# Patient Record
Sex: MALE | Race: WHITE | Employment: OTHER | URBAN - METROPOLITAN AREA
[De-identification: names, ages, dates, MRNs, and addresses within clinical notes are randomized per-mention and may not be internally consistent; named-entity substitution may affect disease eponyms.]

---

## 2017-01-17 PROBLEM — N18.30 CHRONIC KIDNEY DISEASE, STAGE III (MODERATE) (HCC): Status: ACTIVE | Noted: 2017-01-17

## 2017-02-01 ENCOUNTER — HOSPITAL ENCOUNTER (OUTPATIENT)
Dept: OTHER | Age: 82
Discharge: HOME | End: 2017-02-01
Attending: INTERNAL MEDICINE | Admitting: INTERNAL MEDICINE

## 2017-02-14 ENCOUNTER — HOSPITAL ENCOUNTER (OUTPATIENT)
Dept: OTHER | Age: 82
Discharge: HOME | End: 2017-02-14
Attending: ORTHOPAEDIC SURGERY | Admitting: ORTHOPAEDIC SURGERY

## 2017-02-24 LAB
ANION GAP SERPL CALCULATED.3IONS-SCNC: 11 MMOL/L (ref 4–16)
BUN BLDV-MCNC: 25 MG/DL (ref 6–23)
CALCIUM SERPL-MCNC: 8.6 MG/DL (ref 8.3–10.6)
CHLORIDE BLD-SCNC: 104 MMOL/L (ref 99–110)
CO2: 26 MMOL/L (ref 21–32)
CREAT SERPL-MCNC: 1.8 MG/DL (ref 0.9–1.3)
GFR AFRICAN AMERICAN: 43 ML/MIN/1.73M2
GFR NON-AFRICAN AMERICAN: 36 ML/MIN/1.73M2
GLUCOSE BLD-MCNC: 93 MG/DL (ref 70–140)
HCT VFR BLD CALC: 26.3 % (ref 42–52)
HEMOGLOBIN: 8.3 GM/DL (ref 13.5–18)
INR BLD: 1.1 INDEX
MCH RBC QN AUTO: 31.4 PG (ref 27–31)
MCHC RBC AUTO-ENTMCNC: 31.6 % (ref 32–36)
MCV RBC AUTO: 99.6 FL (ref 78–100)
PDW BLD-RTO: 13.4 % (ref 11.7–14.9)
PLATELET # BLD: 238 K/CU MM (ref 140–440)
PMV BLD AUTO: 10.2 FL (ref 7.5–11.1)
POTASSIUM SERPL-SCNC: 4.4 MMOL/L (ref 3.5–5.1)
PROTHROMBIN TIME: 12.6 SECONDS (ref 9.12–12.5)
RBC # BLD: 2.64 M/CU MM (ref 4.6–6.2)
SODIUM BLD-SCNC: 141 MMOL/L (ref 135–145)
WBC # BLD: 8.2 K/CU MM (ref 4–10.5)

## 2017-02-28 LAB
INR BLD: 1.36 INDEX
PROTHROMBIN TIME: 15.7 SECONDS (ref 9.12–12.5)

## 2017-03-01 ENCOUNTER — HOSPITAL ENCOUNTER (OUTPATIENT)
Dept: OTHER | Age: 82
Discharge: HOME | End: 2017-03-01
Attending: INTERNAL MEDICINE | Admitting: INTERNAL MEDICINE

## 2017-03-03 LAB
ANION GAP SERPL CALCULATED.3IONS-SCNC: 13 MMOL/L (ref 4–16)
BUN BLDV-MCNC: 34 MG/DL (ref 6–23)
CALCIUM SERPL-MCNC: 8.7 MG/DL (ref 8.3–10.6)
CHLORIDE BLD-SCNC: 101 MMOL/L (ref 99–110)
CO2: 23 MMOL/L (ref 21–32)
CREAT SERPL-MCNC: 1.8 MG/DL (ref 0.9–1.3)
GFR AFRICAN AMERICAN: 43 ML/MIN/1.73M2
GFR NON-AFRICAN AMERICAN: 36 ML/MIN/1.73M2
GLUCOSE BLD-MCNC: 103 MG/DL (ref 70–140)
HCT VFR BLD CALC: 29.3 % (ref 42–52)
HEMOGLOBIN: 9 GM/DL (ref 13.5–18)
INR BLD: 1.67 INDEX
MCH RBC QN AUTO: 30.8 PG (ref 27–31)
MCHC RBC AUTO-ENTMCNC: 30.7 % (ref 32–36)
MCV RBC AUTO: 100.3 FL (ref 78–100)
PDW BLD-RTO: 13.3 % (ref 11.7–14.9)
PLATELET # BLD: 388 K/CU MM (ref 140–440)
PMV BLD AUTO: 10.2 FL (ref 7.5–11.1)
POTASSIUM SERPL-SCNC: 4.8 MMOL/L (ref 3.5–5.1)
PROTHROMBIN TIME: 19.3 SECONDS (ref 9.12–12.5)
RBC # BLD: 2.92 M/CU MM (ref 4.6–6.2)
SODIUM BLD-SCNC: 137 MMOL/L (ref 135–145)
WBC # BLD: 8 K/CU MM (ref 4–10.5)

## 2017-03-07 LAB
INR BLD: 1.7 INDEX
PROTHROMBIN TIME: 19.7 SECONDS (ref 9.12–12.5)

## 2017-03-10 LAB
ANION GAP SERPL CALCULATED.3IONS-SCNC: 10 MMOL/L (ref 4–16)
BUN BLDV-MCNC: 23 MG/DL (ref 6–23)
CALCIUM SERPL-MCNC: 8.4 MG/DL (ref 8.3–10.6)
CHLORIDE BLD-SCNC: 102 MMOL/L (ref 99–110)
CO2: 26 MMOL/L (ref 21–32)
CREAT SERPL-MCNC: 1.8 MG/DL (ref 0.9–1.3)
GFR AFRICAN AMERICAN: 43 ML/MIN/1.73M2
GFR NON-AFRICAN AMERICAN: 36 ML/MIN/1.73M2
GLUCOSE BLD-MCNC: 92 MG/DL (ref 70–140)
HCT VFR BLD CALC: 27.5 % (ref 42–52)
HEMOGLOBIN: 8.3 GM/DL (ref 13.5–18)
INR BLD: 1.23 INDEX
MCH RBC QN AUTO: 30.1 PG (ref 27–31)
MCHC RBC AUTO-ENTMCNC: 30.2 % (ref 32–36)
MCV RBC AUTO: 99.6 FL (ref 78–100)
PDW BLD-RTO: 13.4 % (ref 11.7–14.9)
PLATELET # BLD: 445 K/CU MM (ref 140–440)
PMV BLD AUTO: 9.7 FL (ref 7.5–11.1)
POTASSIUM SERPL-SCNC: 4.4 MMOL/L (ref 3.5–5.1)
PROTHROMBIN TIME: 14.1 SECONDS (ref 9.12–12.5)
RBC # BLD: 2.76 M/CU MM (ref 4.6–6.2)
SODIUM BLD-SCNC: 138 MMOL/L (ref 135–145)
WBC # BLD: 7.7 K/CU MM (ref 4–10.5)

## 2017-03-14 LAB
INR BLD: 1.34 INDEX
PROTHROMBIN TIME: 15.4 SECONDS (ref 9.12–12.5)

## 2017-03-17 LAB
ANION GAP SERPL CALCULATED.3IONS-SCNC: 10 MMOL/L (ref 4–16)
BUN BLDV-MCNC: 26 MG/DL (ref 6–23)
CALCIUM SERPL-MCNC: 8.6 MG/DL (ref 8.3–10.6)
CHLORIDE BLD-SCNC: 103 MMOL/L (ref 99–110)
CO2: 26 MMOL/L (ref 21–32)
CREAT SERPL-MCNC: 1.8 MG/DL (ref 0.9–1.3)
GFR AFRICAN AMERICAN: 43 ML/MIN/1.73M2
GFR NON-AFRICAN AMERICAN: 36 ML/MIN/1.73M2
GLUCOSE BLD-MCNC: 98 MG/DL (ref 70–140)
HCT VFR BLD CALC: 28.7 % (ref 42–52)
HEMOGLOBIN: 8.7 GM/DL (ref 13.5–18)
INR BLD: 2.08 INDEX
MCH RBC QN AUTO: 29.8 PG (ref 27–31)
MCHC RBC AUTO-ENTMCNC: 30.3 % (ref 32–36)
MCV RBC AUTO: 98.3 FL (ref 78–100)
PDW BLD-RTO: 13 % (ref 11.7–14.9)
PLATELET # BLD: 352 K/CU MM (ref 140–440)
PMV BLD AUTO: 9.8 FL (ref 7.5–11.1)
POTASSIUM SERPL-SCNC: 4.2 MMOL/L (ref 3.5–5.1)
PROTHROMBIN TIME: 24.2 SECONDS (ref 9.12–12.5)
RBC # BLD: 2.92 M/CU MM (ref 4.6–6.2)
SODIUM BLD-SCNC: 139 MMOL/L (ref 135–145)
WBC # BLD: 5.8 K/CU MM (ref 4–10.5)

## 2017-04-24 ENCOUNTER — OFFICE VISIT (OUTPATIENT)
Dept: CARDIOLOGY CLINIC | Age: 82
End: 2017-04-24

## 2017-04-24 VITALS
SYSTOLIC BLOOD PRESSURE: 138 MMHG | HEIGHT: 71 IN | HEART RATE: 68 BPM | WEIGHT: 165.4 LBS | DIASTOLIC BLOOD PRESSURE: 68 MMHG | BODY MASS INDEX: 23.15 KG/M2

## 2017-04-24 DIAGNOSIS — I82.403 DEEP VEIN THROMBOSIS (DVT) OF BOTH LOWER EXTREMITIES, UNSPECIFIED CHRONICITY, UNSPECIFIED VEIN (HCC): Primary | ICD-10-CM

## 2017-04-24 DIAGNOSIS — I48.0 PAROXYSMAL ATRIAL FIBRILLATION (HCC): ICD-10-CM

## 2017-04-24 DIAGNOSIS — I42.9 CARDIOMYOPATHY (HCC): ICD-10-CM

## 2017-04-24 PROCEDURE — 99214 OFFICE O/P EST MOD 30 MIN: CPT | Performed by: INTERNAL MEDICINE

## 2017-04-24 RX ORDER — FERROUS SULFATE 325(65) MG
TABLET ORAL
Refills: 3 | COMMUNITY
Start: 2017-04-17 | End: 2018-04-01

## 2017-04-24 RX ORDER — FOLIC ACID 1 MG/1
TABLET ORAL
Refills: 3 | Status: ON HOLD | COMMUNITY
Start: 2017-04-18 | End: 2018-06-21 | Stop reason: HOSPADM

## 2017-05-01 ENCOUNTER — PROCEDURE VISIT (OUTPATIENT)
Dept: CARDIOLOGY CLINIC | Age: 82
End: 2017-05-01

## 2017-05-01 DIAGNOSIS — I48.0 PAROXYSMAL ATRIAL FIBRILLATION (HCC): Primary | ICD-10-CM

## 2017-05-01 DIAGNOSIS — I42.9 CARDIOMYOPATHY (HCC): ICD-10-CM

## 2017-05-01 LAB
LV EF: 33 %
LVEF MODALITY: NORMAL

## 2017-05-01 PROCEDURE — 93306 TTE W/DOPPLER COMPLETE: CPT | Performed by: INTERNAL MEDICINE

## 2017-05-04 ENCOUNTER — TELEPHONE (OUTPATIENT)
Dept: CARDIOLOGY CLINIC | Age: 82
End: 2017-05-04

## 2017-05-18 ENCOUNTER — OFFICE VISIT (OUTPATIENT)
Dept: CARDIOLOGY CLINIC | Age: 82
End: 2017-05-18

## 2017-05-18 VITALS
DIASTOLIC BLOOD PRESSURE: 70 MMHG | WEIGHT: 171 LBS | SYSTOLIC BLOOD PRESSURE: 128 MMHG | HEIGHT: 71 IN | BODY MASS INDEX: 23.94 KG/M2 | HEART RATE: 78 BPM

## 2017-05-18 DIAGNOSIS — I42.9 CARDIOMYOPATHY (HCC): Primary | ICD-10-CM

## 2017-05-18 PROCEDURE — 99214 OFFICE O/P EST MOD 30 MIN: CPT | Performed by: INTERNAL MEDICINE

## 2017-05-18 RX ORDER — LISINOPRIL 2.5 MG/1
2.5 TABLET ORAL DAILY
Qty: 30 TABLET | Refills: 3 | Status: SHIPPED | OUTPATIENT
Start: 2017-05-18 | End: 2017-09-25 | Stop reason: ALTCHOICE

## 2017-06-01 ENCOUNTER — HOSPITAL ENCOUNTER (OUTPATIENT)
Dept: GENERAL RADIOLOGY | Age: 82
Discharge: HOME | End: 2017-06-01
Attending: INTERNAL MEDICINE | Admitting: INTERNAL MEDICINE

## 2017-06-01 LAB
ANION GAP SERPL CALCULATED.3IONS-SCNC: 11 MMOL/L (ref 4–16)
BUN BLDV-MCNC: 26 MG/DL (ref 6–23)
CALCIUM SERPL-MCNC: 9.9 MG/DL (ref 8.3–10.6)
CHLORIDE BLD-SCNC: 101 MMOL/L (ref 99–110)
CO2: 27 MMOL/L (ref 21–32)
CREAT SERPL-MCNC: 2 MG/DL (ref 0.9–1.3)
GFR AFRICAN AMERICAN: 38 ML/MIN/1.73M2
GFR NON-AFRICAN AMERICAN: 32 ML/MIN/1.73M2
GLUCOSE BLD-MCNC: 77 MG/DL (ref 70–140)
POTASSIUM SERPL-SCNC: 5 MMOL/L (ref 3.5–5.1)
SODIUM BLD-SCNC: 139 MMOL/L (ref 135–145)

## 2017-06-15 ENCOUNTER — OFFICE VISIT (OUTPATIENT)
Dept: CARDIOLOGY CLINIC | Age: 82
End: 2017-06-15

## 2017-06-15 ENCOUNTER — HOSPITAL ENCOUNTER (OUTPATIENT)
Dept: GENERAL RADIOLOGY | Age: 82
Discharge: HOME | End: 2017-06-15
Attending: INTERNAL MEDICINE | Admitting: INTERNAL MEDICINE

## 2017-06-15 VITALS
WEIGHT: 159.2 LBS | DIASTOLIC BLOOD PRESSURE: 60 MMHG | HEIGHT: 71 IN | BODY MASS INDEX: 22.29 KG/M2 | SYSTOLIC BLOOD PRESSURE: 126 MMHG | HEART RATE: 76 BPM

## 2017-06-15 DIAGNOSIS — N18.3 CKD (CHRONIC KIDNEY DISEASE), STAGE 3 (MODERATE): Primary | ICD-10-CM

## 2017-06-15 LAB
ANION GAP SERPL CALCULATED.3IONS-SCNC: 14 MMOL/L (ref 4–16)
BUN BLDV-MCNC: 46 MG/DL (ref 6–23)
CALCIUM SERPL-MCNC: 9.5 MG/DL (ref 8.3–10.6)
CHLORIDE BLD-SCNC: 98 MMOL/L (ref 99–110)
CO2: 24 MMOL/L (ref 21–32)
CREAT SERPL-MCNC: 3.1 MG/DL (ref 0.9–1.3)
GFR AFRICAN AMERICAN: 23 ML/MIN/1.73M2
GFR NON-AFRICAN AMERICAN: 19 ML/MIN/1.73M2
GLUCOSE BLD-MCNC: 109 MG/DL (ref 70–140)
POTASSIUM SERPL-SCNC: 5.4 MMOL/L (ref 3.5–5.1)
SODIUM BLD-SCNC: 136 MMOL/L (ref 135–145)

## 2017-06-15 PROCEDURE — 99214 OFFICE O/P EST MOD 30 MIN: CPT | Performed by: INTERNAL MEDICINE

## 2017-06-16 ENCOUNTER — TELEPHONE (OUTPATIENT)
Dept: CARDIOLOGY CLINIC | Age: 82
End: 2017-06-16

## 2017-06-20 ENCOUNTER — HOSPITAL ENCOUNTER (OUTPATIENT)
Dept: GENERAL RADIOLOGY | Age: 82
Discharge: HOME | End: 2017-06-20
Attending: INTERNAL MEDICINE | Admitting: INTERNAL MEDICINE

## 2017-06-20 LAB
ANION GAP SERPL CALCULATED.3IONS-SCNC: 13 MMOL/L (ref 4–16)
BUN BLDV-MCNC: 38 MG/DL (ref 6–23)
CALCIUM SERPL-MCNC: 9.5 MG/DL (ref 8.3–10.6)
CHLORIDE BLD-SCNC: 96 MMOL/L (ref 99–110)
CO2: 25 MMOL/L (ref 21–32)
CREAT SERPL-MCNC: 2.2 MG/DL (ref 0.9–1.3)
GFR AFRICAN AMERICAN: 34 ML/MIN/1.73M2
GFR NON-AFRICAN AMERICAN: 28 ML/MIN/1.73M2
GLUCOSE BLD-MCNC: 89 MG/DL (ref 70–140)
POTASSIUM SERPL-SCNC: 5.5 MMOL/L (ref 3.5–5.1)
SODIUM BLD-SCNC: 134 MMOL/L (ref 135–145)

## 2017-06-29 ENCOUNTER — OFFICE VISIT (OUTPATIENT)
Dept: CARDIOLOGY CLINIC | Age: 82
End: 2017-06-29

## 2017-06-29 VITALS
SYSTOLIC BLOOD PRESSURE: 138 MMHG | BODY MASS INDEX: 21.98 KG/M2 | HEIGHT: 71 IN | HEART RATE: 62 BPM | DIASTOLIC BLOOD PRESSURE: 78 MMHG | WEIGHT: 156.97 LBS

## 2017-06-29 DIAGNOSIS — I42.9 CARDIOMYOPATHY (HCC): Primary | ICD-10-CM

## 2017-06-29 PROCEDURE — 99214 OFFICE O/P EST MOD 30 MIN: CPT | Performed by: INTERNAL MEDICINE

## 2017-07-25 ENCOUNTER — HOSPITAL ENCOUNTER (OUTPATIENT)
Dept: PHYSICAL THERAPY | Age: 82
Discharge: HOME | End: 2017-07-31
Attending: ANESTHESIOLOGY | Admitting: ANESTHESIOLOGY

## 2017-08-01 ENCOUNTER — HOSPITAL ENCOUNTER (OUTPATIENT)
Dept: OTHER | Age: 82
Discharge: HOME | End: 2017-08-31
Attending: ANESTHESIOLOGY | Admitting: ANESTHESIOLOGY

## 2017-08-03 ENCOUNTER — HOSPITAL ENCOUNTER (OUTPATIENT)
Dept: PHYSICAL THERAPY | Age: 82
Discharge: HOME | End: 2017-08-03

## 2017-08-08 ENCOUNTER — HOSPITAL ENCOUNTER (OUTPATIENT)
Dept: PHYSICAL THERAPY | Age: 82
Discharge: HOME | End: 2017-08-08

## 2017-08-10 ENCOUNTER — HOSPITAL ENCOUNTER (OUTPATIENT)
Dept: PHYSICAL THERAPY | Age: 82
Discharge: HOME | End: 2017-08-10

## 2017-08-22 ENCOUNTER — HOSPITAL ENCOUNTER (OUTPATIENT)
Dept: PHYSICAL THERAPY | Age: 82
Discharge: HOME | End: 2017-08-22

## 2017-08-28 ENCOUNTER — PROCEDURE VISIT (OUTPATIENT)
Dept: CARDIOLOGY CLINIC | Age: 82
End: 2017-08-28

## 2017-08-28 DIAGNOSIS — I42.9 CARDIOMYOPATHY, UNSPECIFIED TYPE (HCC): Primary | ICD-10-CM

## 2017-08-28 PROCEDURE — 93308 TTE F-UP OR LMTD: CPT | Performed by: INTERNAL MEDICINE

## 2017-08-29 ENCOUNTER — TELEPHONE (OUTPATIENT)
Dept: CARDIOLOGY CLINIC | Age: 82
End: 2017-08-29

## 2017-08-29 ENCOUNTER — HOSPITAL ENCOUNTER (OUTPATIENT)
Dept: PHYSICAL THERAPY | Age: 82
Discharge: HOME | End: 2017-08-29

## 2017-09-01 ENCOUNTER — HOSPITAL ENCOUNTER (OUTPATIENT)
Dept: OTHER | Age: 82
Discharge: HOME | End: 2017-09-30
Attending: ANESTHESIOLOGY | Admitting: ANESTHESIOLOGY

## 2017-09-07 ENCOUNTER — OFFICE VISIT (OUTPATIENT)
Dept: CARDIOLOGY CLINIC | Age: 82
End: 2017-09-07

## 2017-09-07 ENCOUNTER — HOSPITAL ENCOUNTER (OUTPATIENT)
Dept: PHYSICAL THERAPY | Age: 82
Discharge: HOME | End: 2017-09-07

## 2017-09-07 VITALS
DIASTOLIC BLOOD PRESSURE: 60 MMHG | BODY MASS INDEX: 22.4 KG/M2 | SYSTOLIC BLOOD PRESSURE: 126 MMHG | HEART RATE: 64 BPM | WEIGHT: 160 LBS | HEIGHT: 71 IN

## 2017-09-07 DIAGNOSIS — I42.9 CARDIOMYOPATHY, UNSPECIFIED TYPE (HCC): Primary | ICD-10-CM

## 2017-09-07 PROCEDURE — 99214 OFFICE O/P EST MOD 30 MIN: CPT | Performed by: INTERNAL MEDICINE

## 2017-09-21 ENCOUNTER — HOSPITAL ENCOUNTER (OUTPATIENT)
Dept: PHYSICAL THERAPY | Age: 82
Discharge: HOME | End: 2017-09-21

## 2017-09-28 ENCOUNTER — HOSPITAL ENCOUNTER (OUTPATIENT)
Dept: PHYSICAL THERAPY | Age: 82
Discharge: HOME | End: 2017-09-28

## 2017-09-29 ENCOUNTER — INITIAL CONSULT (OUTPATIENT)
Dept: CARDIOLOGY CLINIC | Age: 82
End: 2017-09-29

## 2017-09-29 VITALS
HEIGHT: 71 IN | DIASTOLIC BLOOD PRESSURE: 86 MMHG | WEIGHT: 162.6 LBS | SYSTOLIC BLOOD PRESSURE: 136 MMHG | BODY MASS INDEX: 22.76 KG/M2 | HEART RATE: 76 BPM | OXYGEN SATURATION: 92 %

## 2017-09-29 DIAGNOSIS — I50.41 ACUTE COMBINED SYSTOLIC AND DIASTOLIC CONGESTIVE HEART FAILURE (HCC): ICD-10-CM

## 2017-09-29 DIAGNOSIS — I51.9 CHRONIC LEFT VENTRICULAR SYSTOLIC DYSFUNCTION: Primary | ICD-10-CM

## 2017-09-29 PROCEDURE — 99214 OFFICE O/P EST MOD 30 MIN: CPT | Performed by: INTERNAL MEDICINE

## 2017-09-29 NOTE — PROGRESS NOTES
02/28/2017       Family history:   Family History   Problem Relation Age of Onset    Cancer Father     Cancer Brother     High Blood Pressure Grandchild     High Blood Pressure Daughter        Social history :  reports that he quit smoking about 39 years ago. He has never used smokeless tobacco. He reports that he does not drink alcohol or use drugs.     No Known Allergies    Current Outpatient Prescriptions on File Prior to Visit   Medication Sig Dispense Refill    metoprolol tartrate (LOPRESSOR) 25 MG tablet Take 0.5 tablets by mouth 2 times daily 60 tablet 3    ferrous sulfate 325 (65 FE) MG tablet TAKE 1 TABLET BY MOUTH 3 TIMES A DAY  3    folic acid (FOLVITE) 1 MG tablet TAKE 1 TABLET BY MOUTH EVERY DAY  3    tobramycin (TOBREX) 0.3 % ophthalmic solution PLACE 1 DROP IN LEFT EYE EVERY 3 HOURS AS NEEDED  0    ranitidine (ZANTAC) 150 MG capsule Take 150 mg by mouth daily as needed       esomeprazole (NEXIUM) 40 MG delayed release capsule Take 40 mg by mouth every morning (before breakfast)      imipramine (TOFRANIL) 50 MG tablet Take 50 mg by mouth nightly      warfarin (COUMADIN) 2.5 MG tablet Take 2.5 mg by mouth Six times weekly Takes 1 tablet on Tuesday, Wednesday, Thursday, Friday, Saturday, Sunday      warfarin (COUMADIN) 2.5 MG tablet Take 3.75 mg by mouth once a week Takes 1 and 1/2 tablets by mouth on Mondays      allopurinol (ZYLOPRIM) 300 MG tablet Take 300 mg by mouth daily      levothyroxine (SYNTHROID) 75 MCG tablet   Take 75 mcg by mouth daily   5    timolol (TIMOPTIC) 0.5 % ophthalmic solution   Place 1 drop into the right eye daily   3    traMADol (ULTRAM) 50 MG tablet Take 50 mg by mouth every 6 hours as needed for Pain   5    pentoxifylline (TRENTAL) 400 MG CR tablet Take 400 mg by mouth 2 times daily      fluticasone (FLONASE) 50 MCG/ACT nasal spray 2 sprays by Nasal route daily      acetaminophen (TYLENOL) 500 MG tablet Take 500 mg by mouth every 6 hours as needed for Pain

## 2017-09-29 NOTE — MR AVS SNAPSHOT
After Visit Summary             Milan Wiley   2017 1:45 PM   Initial consult    Description:  Male : 10/28/1927   Provider:  Alyssia Martinez MD   Department:  Cardiology [address] and Future Appointments         Below is a list of your follow-up and future appointments. This may not be a complete list as you may have made appointments directly with providers that we are not aware of or your providers may have made some for you. Please call your providers to confirm appointments. It is important to keep your appointments. Please bring your current insurance card, photo ID, co-pay, and all medication bottles to your appointment. If self-pay, payment is expected at the time of service. Your To-Do List     Future Appointments Provider Department Dept Phone    10/5/2017 2:00 PM [facility] [phone]    10/9/2017 12:00 PM SCHEDULE, Jeevan Loweryarez [address] [address] [phone]    10/12/2017 2:00 PM [facility] [phone]    2017 2:20 PM Juan Manuel Souza MD Cardiology [facility] 104 [phone]    Please arrive 15 minutes prior to appointment, bring photo ID and insurance card. 2017 11:45 AM Hunter Langley MD [facility]  [phone]    Please arrive 15 minutes prior to appointment time, bring insurance card and photo ID. Future Orders Complete By Expires    NM cardiac MUGA scan [81802 Custom]  2017 (Approximate) 2017         Information from Your Visit        Department     Name Address Phone Fax    Cardiology Shante Morris [facility] 104 [address] [place] [address] [phone]      You Were Seen for:         Comments    Acute combined systolic and diastolic congestive heart failure ([facility] [address]   [480635]         Vital Signs Blood Pressure Pulse Height Weight Oxygen Saturation Body Mass Index    136/86 76 5' 11\" (1.803 m) 162 lb 9.6 oz (73.8 kg) 92% 22.68 kg/m2    Smoking Status                   Former Smoker              Today's Medication Changes          These changes are accurate as of: 9/29/17  2:20 PM.  If you have any questions, ask your nurse or doctor.                STOP taking these medications           cephALEXin 250 MG capsule   Commonly known as:  Kelsea Stammer by:  Dwayne Jimenez MD               Your Current Medications Are              metoprolol tartrate (LOPRESSOR) 25 MG tablet Take 0.5 tablets by mouth 2 times daily    ferrous sulfate 325 (65 FE) MG tablet TAKE 1 TABLET BY MOUTH 3 TIMES A DAY    folic acid (FOLVITE) 1 MG tablet TAKE 1 TABLET BY MOUTH EVERY DAY    tobramycin (TOBREX) 0.3 % ophthalmic solution PLACE 1 DROP IN LEFT EYE EVERY 3 HOURS AS NEEDED    ranitidine (ZANTAC) 150 MG capsule Take 150 mg by mouth daily as needed     esomeprazole (NEXIUM) 40 MG delayed release capsule Take 40 mg by mouth every morning (before breakfast)    imipramine (TOFRANIL) 50 MG tablet Take 50 mg by mouth nightly    warfarin (COUMADIN) 2.5 MG tablet Take 2.5 mg by mouth Six times weekly Takes 1 tablet on Tuesday, Wednesday, Thursday, Friday, Saturday, Sunday    warfarin (COUMADIN) 2.5 MG tablet Take 3.75 mg by mouth once a week Takes 1 and 1/2 tablets by mouth on Mondays    allopurinol (ZYLOPRIM) 300 MG tablet Take 300 mg by mouth daily    levothyroxine (SYNTHROID) 75 MCG tablet   Take 75 mcg by mouth daily     timolol (TIMOPTIC) 0.5 % ophthalmic solution   Place 1 drop into the right eye daily     traMADol (ULTRAM) 50 MG tablet Take 50 mg by mouth every 6 hours as needed for Pain     pentoxifylline (TRENTAL) 400 MG CR tablet Take 400 mg by mouth 2 times daily    fluticasone (FLONASE) 50 MCG/ACT nasal spray 2 sprays by Nasal route daily    acetaminophen (TYLENOL) 500 MG tablet Take 500 mg by mouth every 6 hours

## 2017-10-01 ENCOUNTER — HOSPITAL ENCOUNTER (OUTPATIENT)
Dept: OTHER | Age: 82
Discharge: HOME | End: 2017-10-20
Attending: ANESTHESIOLOGY | Admitting: ANESTHESIOLOGY

## 2017-10-05 ENCOUNTER — HOSPITAL ENCOUNTER (OUTPATIENT)
Dept: PHYSICAL THERAPY | Age: 82
Discharge: HOME | End: 2017-10-05

## 2017-10-05 NOTE — FLOWSHEET NOTE
Modalities:  [] Therapeutic Activity     [] Ultrasound  [] Elec  Stim  [x] Gait Training      [] Cervical Traction [] Lumbar Traction  [] Neuromuscular Re-education    [] Cold/hotpack [] Iontophoresis   [] Instruction in HEP      [] Vasopneumatic     [x] Manual Therapy               [] Self care home management                    (    ) Dry needling    Post Tx Pain Ratin/10     Plan:(Fequency/duration/# of visits)   [x] Continue per plan of care [] Alter current plan   [] Plan of care initiated [] Hold pending MD visit [] Discharge         Time In: 155  Time Out: 246  Timed Code Treatment Minutes: 51  Total Treatment Minutes:51  Electronically signed by:  Jorge Alberto Dowell 10/5/2017, 1:59 PM

## 2017-10-09 ENCOUNTER — PROCEDURE VISIT (OUTPATIENT)
Dept: CARDIOLOGY CLINIC | Age: 82
End: 2017-10-09

## 2017-10-09 DIAGNOSIS — I50.41 ACUTE COMBINED SYSTOLIC AND DIASTOLIC CONGESTIVE HEART FAILURE (HCC): ICD-10-CM

## 2017-10-09 LAB
LV EF: 59 %
LVEF MODALITY: NORMAL

## 2017-10-09 PROCEDURE — A9560 TC99M LABELED RBC: HCPCS | Performed by: INTERNAL MEDICINE

## 2017-10-09 PROCEDURE — 78472 GATED HEART PLANAR SINGLE: CPT | Performed by: INTERNAL MEDICINE

## 2017-10-11 ENCOUNTER — TELEPHONE (OUTPATIENT)
Dept: CARDIOLOGY CLINIC | Age: 82
End: 2017-10-11

## 2017-10-12 ENCOUNTER — TELEPHONE (OUTPATIENT)
Dept: CARDIOLOGY CLINIC | Age: 82
End: 2017-10-12

## 2017-10-12 ENCOUNTER — HOSPITAL ENCOUNTER (OUTPATIENT)
Dept: PHYSICAL THERAPY | Age: 82
Discharge: HOME | End: 2017-10-12

## 2017-10-12 NOTE — TELEPHONE ENCOUNTER
Spoke with patient and daughter advised them per Dr Albert Emmanuel they can follow up with Dr Chuyita Ferguson. MUGA come back normal no need for device. They voiced understanding and asked if there was a follow up scheduled I advised they had one in 12/8 @ 2:20 pm. Asked if they wanted to move it up they stated no that would be fine.

## 2017-10-12 NOTE — FLOWSHEET NOTE
(40-59% Impairment, Mod A)  [] CL  (60-79% Impairment, Max A)  [] CM  (80-99% Impairment, Dep.)   [] CN  (100% Impairment, Tot Dep.)                 Goals:GOALS OF PT MET  Short term goals  Time Frame for Short term goals: check @10 sessions  Short term goal 1: 30% Oswestry- @ eval =42%; PLOF LBP w/ IADL for @least past 4 months  Long term goals  Time Frame for Long term goals : 10/15/17  Long term goal 1: 20% Oswestry/ 10/12/17 oswestry 19/50  Long term goal 2: ambulate w/ cane for ADL/IADL/ 10/12/17 Has not been walking with a cane  Patient's goal:less pain when standing  Exercise/Equipment/Modalities 8/17/17 #8 8/22/17 #9 8/29/17 #10 8/31/17 #11 9/7/17 #12 9/21/17 #13 9/28/17 #14  3/5 additional 10/5/17 #15  4/5 additional 10/12/17 #16     nustep Seat/arms 11 load 6; x15 min Seat/arms 11 load 6; x15 min Seat/arms 11 load 6; x15 min Seat/arms 11 load 7; x15 min Seat/arms 11 load 7; x15 min Seat/arms 11 load 7; x15 min Seat/arms 11 load 7; x15 min Seat/arms 11 load 7; x15 min Seat/arms 11 load 7; x15 min     Side ABD 2 x 10 R/L only 2 x 10 R/L  1 x 10 R/L 1 x 10 R/L 1 x 10 R/L 1 x 12 R/L 1 x 12 R/L 1 x 15 R/L     Clam shell W/ RTB x15 ea R/L W/ RTB x15 ea R/L Supine w/ RTB, 3 ct 3 x 10 R/L Side #1 x20/#2 x15/#3  x10 all RTB R/L Side #1 x20/#2 x15/#3  x10 all RTB R/L Side #1 x20/#2 x15/#3  x10 all RTB R/L Side #1 x20/#2 x15/#3  x10  R/L Side #1 x20/#2 x15/#3  x10 all RTB R/L Side #1 x25/#2 x15/#3  x10 all RTB     R hip flexor stretch              Gait training  Pt walked around the dept with the rollator and was helped to his car for safety. Pt still unsteady. Cues to lengthen the stride and to walk closer to the rollator. Cues to lengthen the stride and to walk closer to the rollator.  Cues to walk closer to the walker for safety Cues to lengthen his stride        bridges 1 x10 reps, 2 x15 reps w/ 3 ct 3 x 10 w/ 3 ct 3 x 10 w/ 3 ct 2 x25 2 ct 2 x25 2 ct 2 x25 2 ct 2 x25 2 ct 2 x 25 2 ct 2 x 25 2 ct         Supine Reported temporary muscle strain/fatigue w/ exercise Some fatigue and pain decrease Decrease in LB pain Some fatigue and pain decrease No change in pain Pain decrease Decrease in pain Pain reduction Good demonstration of HEP   Plan for Next Session: Cont PT progress as able Cont PT progress as able Cont PT progress as able Cont PT progress as able Cont PT progress as able    Per Amedeo Sleeper pt will now do 1 x week x 5 weeks Cont PT progress as able    Per Amedeo Sleeper pt will now do 1 x week x 5 weeks Cont PT progress as able    Per Amedeo Sleeper pt will now do 1 x week x 5 weeks Cont PT progress as able    Per Amedeo Sleeper pt will now do 1 x week x 1 more week Discharge      Modality/intervention used:  [x] Therapeutic Exercise  [] Modalities:  [] Therapeutic Activity     [] Ultrasound  [] Elec  Stim  [x] Gait Training      [] Cervical Traction [] Lumbar Traction  [] Neuromuscular Re-education    [] Cold/hotpack [] Iontophoresis   [] Instruction in HEP      [] Vasopneumatic     [x] Manual Therapy               [] Self care home management                    (    ) Dry needling    Post Tx Pain Ratin10     Plan:(Fequency/duration/# of visits)   [] Continue per plan of care [] Alter current plan   [] Plan of care initiated [] Hold pending MD visit [x] Discharge         Time In: 200  Time Out: 254  Timed Code Treatment Minutes: 54  Total Treatment Minutes:54  Electronically signed by:  Kim Saucedo 10/12/2017, 2:05 PM   Anaya Ahumada PTA/Nevaeh Platt PT

## 2017-10-15 ASSESSMENT — ENCOUNTER SYMPTOMS
PHOTOPHOBIA: 0
BACK PAIN: 0
VOMITING: 0
NAUSEA: 0
EYE PAIN: 0
BLOOD IN STOOL: 0
CONSTIPATION: 0
COUGH: 0
ABDOMINAL PAIN: 0
CHEST TIGHTNESS: 0
SHORTNESS OF BREATH: 0
WHEEZING: 0
COLOR CHANGE: 0
DIARRHEA: 0

## 2018-01-22 ENCOUNTER — OFFICE VISIT (OUTPATIENT)
Dept: CARDIOLOGY CLINIC | Age: 83
End: 2018-01-22

## 2018-01-22 VITALS
DIASTOLIC BLOOD PRESSURE: 90 MMHG | HEART RATE: 72 BPM | HEIGHT: 71 IN | BODY MASS INDEX: 23.66 KG/M2 | SYSTOLIC BLOOD PRESSURE: 160 MMHG | WEIGHT: 169 LBS

## 2018-01-22 DIAGNOSIS — I10 ESSENTIAL HYPERTENSION: Primary | ICD-10-CM

## 2018-01-22 PROCEDURE — 1036F TOBACCO NON-USER: CPT | Performed by: INTERNAL MEDICINE

## 2018-01-22 PROCEDURE — G8484 FLU IMMUNIZE NO ADMIN: HCPCS | Performed by: INTERNAL MEDICINE

## 2018-01-22 PROCEDURE — 1123F ACP DISCUSS/DSCN MKR DOCD: CPT | Performed by: INTERNAL MEDICINE

## 2018-01-22 PROCEDURE — G8420 CALC BMI NORM PARAMETERS: HCPCS | Performed by: INTERNAL MEDICINE

## 2018-01-22 PROCEDURE — G8427 DOCREV CUR MEDS BY ELIG CLIN: HCPCS | Performed by: INTERNAL MEDICINE

## 2018-01-22 PROCEDURE — 99214 OFFICE O/P EST MOD 30 MIN: CPT | Performed by: INTERNAL MEDICINE

## 2018-01-22 PROCEDURE — 4040F PNEUMOC VAC/ADMIN/RCVD: CPT | Performed by: INTERNAL MEDICINE

## 2018-01-22 NOTE — PROGRESS NOTES
Gisell August MD        OFFICE  FOLLOWUP NOTE    Chief complaints: patient is here for management of hypothyroidism, HTN, AFlutter, DYSLPIDEMIA, cardiomyopathy    Subjective: patient feels better, no chest pain, no shortness of breath, no dizziness, no palpitations    HPI Estela Jaramillo is a 80 y. o.year old who  has a past medical history of Acute renal failure (ARF) ([facility] [address] Arthritis; Cancer ([facility] [address] Chronic combined systolic and diastolic congestive heart failure ([facility] [address] Chronic kidney disease (CKD); DVT (deep venous thrombosis) (HCC); H/O anemia of chronic renal failure; H/O CHF; H/O percutaneous left heart catheterization; History of blood transfusion; History of Holter monitoring; History of nuclear stress test; Hx of blood clots; Hx of echocardiogram; HX OTHER MEDICAL; Hyperlipidemia; Hypertension; Hypothyroidism; and Thyroid disease. and presents for management of hypothyroidism, AFlutter, DYSLPIDEMIA, cardiomyopathy  which are well controlled, he was seen by EP and had MUGA, normal LVEF, did not require ICD. His blood pressure is high today.       Current Outpatient Prescriptions   Medication Sig Dispense Refill    metoprolol tartrate (LOPRESSOR) 25 MG tablet Take 0.5 tablets by mouth 2 times daily 60 tablet 3    ferrous sulfate 325 (65 FE) MG tablet TAKE 1 TABLET BY MOUTH 3 TIMES A DAY  3    folic acid (FOLVITE) 1 MG tablet TAKE 1 TABLET BY MOUTH EVERY DAY  3    tobramycin (TOBREX) 0.3 % ophthalmic solution PLACE 1 DROP IN LEFT EYE EVERY 3 HOURS AS NEEDED  0    ranitidine (ZANTAC) 150 MG capsule Take 150 mg by mouth daily as needed       esomeprazole (NEXIUM) 40 MG delayed release capsule Take 40 mg by mouth every morning (before breakfast)      imipramine (TOFRANIL) 50 MG tablet Take 50 mg by mouth nightly      warfarin (COUMADIN) 2.5 MG tablet Take 2.5 mg by mouth Six times weekly Takes 1 tablet on Monday and one tablet on all other days      acetaminophen (TYLENOL) 500 MG tablet Take 500 significant CAD    COLOSTOMY Right 1989    DILATATION, ESOPHAGUS      JOINT REPLACEMENT      PROSTATECTOMY      REVISION TOTAL HIP ARTHROPLASTY Right 02/28/2017     Family History   Problem Relation Age of Onset    Cancer Father     Cancer Brother     High Blood Pressure Grandchild     High Blood Pressure Daughter      Social History   Substance Use Topics    Smoking status: Former Smoker     Quit date: 9/29/1978    Smokeless tobacco: Never Used      Comment: quit in 1978    Alcohol use No      [unfilled]  Review of Systems:   · Constitutional: No Fever or Weight Loss   · Eyes: No Decreased Vision  · ENT: No Headaches, Hearing Loss or Vertigo  · Cardiovascular: No chest pain, dyspnea on exertion, palpitations or loss of consciousness  · Respiratory: No cough or wheezing    · Gastrointestinal: No abdominal pain, appetite loss, blood in stools, constipation, diarrhea or heartburn  · Genitourinary: No dysuria, trouble voiding, or hematuria  · Musculoskeletal:  No gait disturbance, weakness or joint complaints  · Integumentary: No rash or pruritis  · Neurological: No TIA or stroke symptoms  · Psychiatric: No anxiety or depression  · Endocrine: No malaise, fatigue or temperature intolerance  · Hematologic/Lymphatic: No bleeding problems, blood clots or swollen lymph nodes  · Allergic/Immunologic: No nasal congestion or hives  All systems negative except as marked. Objective:  BP (!) 160/90   Pulse 72   Ht 5' 11\" (1.803 m)   Wt 169 lb (76.7 kg)   BMI 23.57 kg/m²   Wt Readings from Last 3 Encounters:   01/22/18 169 lb (76.7 kg)   12/13/17 164 lb 3.2 oz (74.5 kg)   09/29/17 162 lb 9.6 oz (73.8 kg)     Body mass index is 23.57 kg/m².   GENERAL - Alert, oriented, pleasant, in no apparent distress,normal grooming  HEENT  pupils are reactive to light and accomodation, cornea intact, conjunctive normal color, ears are normal in exam,throat exam in normal, teeth, gum and palate are normal, oral mucosa is normal without any notation of pallor or cyanosis  Neck - Supple. No jugular venous distention noted. No carotid bruits, no apical -carotid delay  Respiratory:  Normal breath sounds, No respiratory distress, No wheezing, No chest tenderness. ,no use of accessory muscles, diaphragm movement is normal  Cardiovascular: (PMI) apex non displaced,no lifts no thrills, no s3,no s4, Normal heart rate, Normal rhythm, No murmurs, No rubs, No gallops. Carotid arteries pulse and amplitude are normal no bruit, no abdominal bruit noted ( normal abdominal aorta ausculation), femoral arteries pulse and amplitude are normal no bruit, pedal pulses are normal  Femoral pulses have normal amplitude, no bruits   Extremities - No cyanosis, clubbing, or significant edema, no varicose veins    Abdomen  No masses, tenderness, or organomegaly, no hepato-splenomegally, no bruits  Musculoskeletal  No significant edema, no kyphosis or scoliosis, no deformity in any extremity noted, muscle strength and tone are normal  Skin: no ulcer,no scar,no stasis dermatitis   Neurologic  alert oriented times 3,Cranial nerves II through XII are grossly intact. There were no gross focal neurologic abnormalities. All sensory and motor nerves examined and were normal  Psychiatric: normal mood and affect    No results found for: CKTOTAL, CKMB, CKMBINDEX, TROPONINI  BNP:  No results found for: BNP  PT/INR:  No results found for: PTINR  No results found for: LABA1C  No results found for: CHOL, TRIG, HDL, LDLCALC, LDLDIRECT  Lab Results   Component Value Date    ALT 24 06/16/2017    AST 18 06/16/2017     TSH:  No results found for: TSH    Impression:  Asher Russell is a 80 y. o.year old who  has a past medical history of Acute renal failure (ARF) ([facility] [address] Arthritis; Cancer ([facility] [address] Chronic combined systolic and diastolic congestive heart failure ([facility] [address]  Chronic kidney disease (CKD); DVT (deep venous thrombosis) ([facility] [address] H/O anemia of chronic renal failure; H/O CHF; H/O percutaneous

## 2018-04-01 PROBLEM — M62.82 RHABDOMYOLYSIS: Status: ACTIVE | Noted: 2018-04-01

## 2018-04-04 PROBLEM — R78.81 BACTEREMIA DUE TO GRAM-NEGATIVE BACTERIA: Status: ACTIVE | Noted: 2018-04-04

## 2018-04-04 PROBLEM — M62.82 RHABDOMYOLYSIS: Status: RESOLVED | Noted: 2018-04-01 | Resolved: 2018-04-04

## 2018-04-04 PROBLEM — J18.9 PNEUMONIA: Status: ACTIVE | Noted: 2018-04-04

## 2018-06-09 PROBLEM — R53.1 WEAKNESS: Status: ACTIVE | Noted: 2018-06-09

## 2018-06-12 PROBLEM — R53.1 WEAKNESS: Status: ACTIVE | Noted: 2018-06-12

## 2018-08-15 PROBLEM — M62.82 RHABDOMYOLYSIS: Status: ACTIVE | Noted: 2018-08-15
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
## 2018-08-15 PROBLEM — G93.41 ACUTE METABOLIC ENCEPHALOPATHY: Status: ACTIVE | Noted: 2018-08-15

## 2018-08-23 ENCOUNTER — OFFICE VISIT (OUTPATIENT)
Dept: CARDIOLOGY CLINIC | Age: 83
End: 2018-08-23

## 2018-08-23 VITALS
HEIGHT: 71 IN | WEIGHT: 171.4 LBS | SYSTOLIC BLOOD PRESSURE: 124 MMHG | BODY MASS INDEX: 24 KG/M2 | DIASTOLIC BLOOD PRESSURE: 70 MMHG | HEART RATE: 82 BPM

## 2018-08-23 DIAGNOSIS — I10 ESSENTIAL HYPERTENSION: Primary | ICD-10-CM

## 2018-08-23 PROCEDURE — 1123F ACP DISCUSS/DSCN MKR DOCD: CPT | Performed by: INTERNAL MEDICINE

## 2018-08-23 PROCEDURE — G8427 DOCREV CUR MEDS BY ELIG CLIN: HCPCS | Performed by: INTERNAL MEDICINE

## 2018-08-23 PROCEDURE — 1101F PT FALLS ASSESS-DOCD LE1/YR: CPT | Performed by: INTERNAL MEDICINE

## 2018-08-23 PROCEDURE — 4040F PNEUMOC VAC/ADMIN/RCVD: CPT | Performed by: INTERNAL MEDICINE

## 2018-08-23 PROCEDURE — 99214 OFFICE O/P EST MOD 30 MIN: CPT | Performed by: INTERNAL MEDICINE

## 2018-08-23 PROCEDURE — G8420 CALC BMI NORM PARAMETERS: HCPCS | Performed by: INTERNAL MEDICINE

## 2018-08-23 PROCEDURE — 1111F DSCHRG MED/CURRENT MED MERGE: CPT | Performed by: INTERNAL MEDICINE

## 2018-08-23 PROCEDURE — 1036F TOBACCO NON-USER: CPT | Performed by: INTERNAL MEDICINE

## 2018-08-23 NOTE — PROGRESS NOTES
 H/O anemia of chronic renal failure     H/O CHF     H/O percutaneous left heart catheterization 5/28/15    No evidence of hemodynamically significant CAD    History of blood transfusion     History of Holter monitoring     Rhythm was Sinus, ventricular in single and couplet forms. Superventricular ectopics in single,pair and run forms.  History of nuclear stress test 5/13/15    EF 41%. Dilated cardiomyopathy with reduction of LV function. Mod-Severe superimposed ischemia in Basa, Mid, Apical Inferior regions.     Hx of blood clots     Hx of echocardiogram 08/28/2017    [id number]%,mild tricuspid regurg,mild to moderate PHTN    HX OTHER MEDICAL 10/09/2017    [id number]%    Hyperlipidemia     Hypertension     Hypothyroidism     Thyroid disease      Past Surgical History:   Procedure Laterality Date    ABDOMEN SURGERY      BLADDER SUSPENSION      CARDIAC CATHETERIZATION  5/28/15    No evidence of hemodynamically significant CAD    COLOSTOMY Right 1989    DILATATION, ESOPHAGUS      JOINT REPLACEMENT      PROSTATECTOMY      REVISION TOTAL HIP ARTHROPLASTY Right 02/28/2017     Family History   Problem Relation Age of Onset    Cancer Father     Cancer Brother     High Blood Pressure Grandchild     High Blood Pressure Daughter      Social History   Substance Use Topics    Smoking status: Former Smoker     Quit date: 9/29/1978    Smokeless tobacco: Never Used      Comment: quit in 1978    Alcohol use No      [unfilled]  Review of Systems:   · Constitutional: No Fever or Weight Loss   · Eyes: No Decreased Vision  · ENT: No Headaches, Hearing Loss or Vertigo  · Cardiovascular: No chest pain, dyspnea on exertion, palpitations or loss of consciousness  · Respiratory: No cough or wheezing    · Gastrointestinal: No abdominal pain, appetite loss, blood in stools, constipation, diarrhea or heartburn  · Genitourinary: No dysuria, trouble voiding, or hematuria  · Musculoskeletal:  No gait disturbance,

## 2018-09-10 ENCOUNTER — OFFICE VISIT (OUTPATIENT)
Dept: CARDIOLOGY CLINIC | Age: 83
End: 2018-09-10

## 2018-09-10 VITALS
HEIGHT: 71 IN | WEIGHT: 175 LBS | DIASTOLIC BLOOD PRESSURE: 80 MMHG | BODY MASS INDEX: 24.5 KG/M2 | SYSTOLIC BLOOD PRESSURE: 130 MMHG | HEART RATE: 80 BPM

## 2018-09-10 DIAGNOSIS — E78.5 DYSLIPIDEMIA: ICD-10-CM

## 2018-09-10 DIAGNOSIS — I42.8 NICM (NONISCHEMIC CARDIOMYOPATHY) (HCC): ICD-10-CM

## 2018-09-10 DIAGNOSIS — I10 ESSENTIAL HYPERTENSION: Primary | ICD-10-CM

## 2018-09-10 PROCEDURE — 1111F DSCHRG MED/CURRENT MED MERGE: CPT | Performed by: INTERNAL MEDICINE

## 2018-09-10 PROCEDURE — G8420 CALC BMI NORM PARAMETERS: HCPCS | Performed by: INTERNAL MEDICINE

## 2018-09-10 PROCEDURE — 99214 OFFICE O/P EST MOD 30 MIN: CPT | Performed by: INTERNAL MEDICINE

## 2018-09-10 PROCEDURE — 1036F TOBACCO NON-USER: CPT | Performed by: INTERNAL MEDICINE

## 2018-09-10 PROCEDURE — 1101F PT FALLS ASSESS-DOCD LE1/YR: CPT | Performed by: INTERNAL MEDICINE

## 2018-09-10 PROCEDURE — 1123F ACP DISCUSS/DSCN MKR DOCD: CPT | Performed by: INTERNAL MEDICINE

## 2018-09-10 PROCEDURE — G8427 DOCREV CUR MEDS BY ELIG CLIN: HCPCS | Performed by: INTERNAL MEDICINE

## 2018-09-10 PROCEDURE — 4040F PNEUMOC VAC/ADMIN/RCVD: CPT | Performed by: INTERNAL MEDICINE

## 2018-09-10 RX ORDER — ROSUVASTATIN CALCIUM 5 MG/1
5 TABLET, COATED ORAL NIGHTLY
COMMUNITY

## 2018-09-10 NOTE — PROGRESS NOTES
Rashad Purcell MD        OFFICE  FOLLOWUP NOTE    Chief complaints: patient is here for management of hypothyroidism, HTN, AFlutter s/p ablation, DYSLPIDEMIA, cardiomyopathy    Subjective: patient feels better, no chest pain, no shortness of breath, no dizziness, no palpitations    JOAQUIM Daigle is a 80 y. o.year old who  has a past medical history of Acute renal failure (ARF) ([facility] [place] [address] Arthritis; Cancer ([facility] [place] [address] Chronic combined systolic and diastolic congestive heart failure ([facility] [place] [address] Chronic kidney disease; Chronic kidney disease (CKD); DVT (deep venous thrombosis) ([facility] [place] [address] H/O anemia of chronic renal failure; H/O CHF; H/O percutaneous left heart catheterization; History of blood transfusion; History of Holter monitoring; History of nuclear stress test; Hx of blood clots; Hx of echocardiogram; HX OTHER MEDICAL; Hyperlipidemia; Hypertension; Hypothyroidism; and Thyroid disease. and presents for management of  hypothyroidism, HTN, AFlutter s/p ablation, DYSLPIDEMIA, cardiomyopathy which are well controlled, last time his midodrine was stopped and goal is to stop florinef also, he was started on norvasc 5mg and clonidine.     Current Outpatient Prescriptions   Medication Sig Dispense Refill    rosuvastatin (CRESTOR) 5 MG tablet Take 5 mg by mouth daily      cloNIDine (CATAPRES) 0.1 MG tablet Take 0.1 mg by mouth every 8 hours as needed for High Blood Pressure      fludrocortisone (FLORINEF) 0.1 MG tablet Take 0.1 mg by mouth daily      amLODIPine (NORVASC) 5 MG tablet Take 5 mg by mouth daily       ondansetron (ZOFRAN) 4 MG tablet Take 4 mg by mouth every 6 hours as needed for Nausea or Vomiting      aspirin 81 MG chewable tablet Take 1 tablet by mouth daily 30 tablet 0    metoprolol tartrate (LOPRESSOR) 25 MG tablet Take 1 tablet by mouth 2 times daily (Patient taking differently: Take 50 mg by mouth 2 times daily ) 60 tablet 0    warfarin (COUMADIN) 3 MG tablet Take 1 tablet by mouth daily 30 tablet 0  folic acid-pyridoxine-cyancobalamin (FOLTX) 1.13-25-2 MG TABS Take 1 tablet by mouth daily 30 tablet     fluconazole (DIFLUCAN) 200 MG tablet Take 200 mg by mouth daily Once daily for the first 5 days of each month.  allopurinol (ZYLOPRIM) 300 MG tablet Take 300 mg by mouth daily      ketoconazole (NIZORAL) 2 % shampoo Apply topically daily as needed for Itching Apply topically daily as needed.  tobramycin (TOBREX) 0.3 % ophthalmic solution PLACE 1 DROP IN LEFT EYE EVERY 3 HOURS AS NEEDED  0    ranitidine (ZANTAC) 150 MG capsule Take 150 mg by mouth daily as needed       esomeprazole (NEXIUM) 40 MG delayed release capsule Take 40 mg by mouth every morning (before breakfast)      imipramine (TOFRANIL) 50 MG tablet Take 50 mg by mouth nightly      acetaminophen (TYLENOL) 500 MG tablet Take 500 mg by mouth every 6 hours as needed for Pain       levothyroxine (SYNTHROID) 75 MCG tablet   Take 75 mcg by mouth daily   5    timolol (TIMOPTIC) 0.5 % ophthalmic solution   Place 1 drop into the right eye daily   3    pentoxifylline (TRENTAL) 400 MG CR tablet Take 400 mg by mouth 2 times daily      fluticasone (FLONASE) 50 MCG/ACT nasal spray 2 sprays by Nasal route daily as needed        No current facility-administered medications for this visit. Allergies: Patient has no known allergies.   Past Medical History:   Diagnosis Date    Acute renal failure (ARF) ([facility] [address]     Arthritis     Cancer ([facility] [address] 1990    Prostate    Chronic combined systolic and diastolic congestive heart failure ([facility] [address] 7/19/2016    Chronic kidney disease     Chronic kidney disease (CKD)     Stage III    DVT (deep venous thrombosis) (HCC)     patient is on coumadin    H/O anemia of chronic renal failure     H/O CHF     H/O percutaneous left heart catheterization 5/28/15    No evidence of hemodynamically significant CAD    History of blood transfusion     History of Holter monitoring     Rhythm was Sinus, ventricular in single and couplet forms. Superventricular ectopics in single,pair and run forms.  History of nuclear stress test 5/13/15    EF 41%. Dilated cardiomyopathy with reduction of LV function. Mod-Severe superimposed ischemia in Basa, Mid, Apical Inferior regions.     Hx of blood clots     Hx of echocardiogram 08/28/2017    EF35-45%,mild tricuspid regurg,mild to moderate PHTN    HX OTHER MEDICAL 10/09/2017    [id number]%    Hyperlipidemia     Hypertension     Hypothyroidism     Thyroid disease      Past Surgical History:   Procedure Laterality Date    ABDOMEN SURGERY      BLADDER SUSPENSION      CARDIAC CATHETERIZATION  5/28/15    No evidence of hemodynamically significant CAD    COLOSTOMY Right 1989    DILATATION, ESOPHAGUS      JOINT REPLACEMENT      PROSTATECTOMY      REVISION TOTAL HIP ARTHROPLASTY Right 02/28/2017     Family History   Problem Relation Age of Onset    Cancer Father     Cancer Brother     High Blood Pressure Grandchild     High Blood Pressure Daughter      Social History   Substance Use Topics    Smoking status: Former Smoker     Quit date: 9/29/1978    Smokeless tobacco: Never Used      Comment: quit in 1978    Alcohol use No      [unfilled]  Review of Systems:   · Constitutional: No Fever or Weight Loss   · Eyes: No Decreased Vision  · ENT: No Headaches, Hearing Loss or Vertigo  · Cardiovascular: No chest pain, dyspnea on exertion, palpitations or loss of consciousness  · Respiratory: No cough or wheezing    · Gastrointestinal: No abdominal pain, appetite loss, blood in stools, constipation, diarrhea or heartburn  · Genitourinary: No dysuria, trouble voiding, or hematuria  · Musculoskeletal:  No gait disturbance, weakness or joint complaints  · Integumentary: No rash or pruritis  · Neurological: No TIA or stroke symptoms  · Psychiatric: No anxiety or depression  · Endocrine: No malaise, fatigue or temperature intolerance  · Hematologic/Lymphatic: No bleeding problems, blood clots or swollen lymph nodes  · Allergic/Immunologic: No nasal congestion or hives  All systems negative except as marked. Objective:  /80   Pulse 80   Ht 5' 11\" (1.803 m)   Wt 175 lb (79.4 kg)   BMI 24.41 kg/m²   Wt Readings from Last 3 Encounters:   09/10/18 175 lb (79.4 kg)   08/23/18 171 lb 6.4 oz (77.7 kg)   08/16/18 183 lb 9.6 oz (83.3 kg)     Body mass index is 24.41 kg/m². GENERAL - Alert, oriented, pleasant, in no apparent distress,normal grooming  HEENT  pupils are reactive to light and accomodation, cornea intact, conjunctive normal color, ears are normal in exam,throat exam in normal, teeth, gum and palate are normal, oral mucosa is normal without any notation of pallor or cyanosis  Neck - Supple. No jugular venous distention noted. No carotid bruits, no apical -carotid delay  Respiratory:  Normal breath sounds, No respiratory distress, No wheezing, No chest tenderness. ,no use of accessory muscles, diaphragm movement is normal  Cardiovascular: (PMI) apex non displaced,no lifts no thrills, no s3,no s4, Normal heart rate, Normal rhythm, No murmurs, No rubs, No gallops. Carotid arteries pulse and amplitude are normal no bruit, no abdominal bruit noted ( normal abdominal aorta ausculation), femoral arteries pulse and amplitude are normal no bruit, pedal pulses are normal  Femoral pulses have normal amplitude, no bruits   Extremities - No cyanosis, clubbing, or significant edema, no varicose veins    Abdomen  No masses, tenderness, or organomegaly, no hepato-splenomegally, no bruits  Musculoskeletal  MILD edema, no kyphosis or scoliosis, no deformity in any extremity noted, muscle strength and tone are normal  Skin: no ulcer,no scar,no stasis dermatitis   Neurologic  alert oriented times 3,Cranial nerves II through XII are grossly intact. There were no gross focal neurologic abnormalities.  All sensory and motor nerves examined and were normal  Psychiatric: normal mood and affect    Lab Results

## 2018-09-27 ENCOUNTER — OFFICE VISIT (OUTPATIENT)
Dept: CARDIOLOGY CLINIC | Age: 83
End: 2018-09-27
Payer: MEDICARE

## 2018-09-27 VITALS
BODY MASS INDEX: 24.53 KG/M2 | HEIGHT: 71 IN | SYSTOLIC BLOOD PRESSURE: 126 MMHG | HEART RATE: 94 BPM | DIASTOLIC BLOOD PRESSURE: 86 MMHG | WEIGHT: 175.2 LBS

## 2018-09-27 DIAGNOSIS — E78.5 DYSLIPIDEMIA: Primary | ICD-10-CM

## 2018-09-27 DIAGNOSIS — I10 ESSENTIAL HYPERTENSION: ICD-10-CM

## 2018-09-27 PROCEDURE — 1101F PT FALLS ASSESS-DOCD LE1/YR: CPT | Performed by: INTERNAL MEDICINE

## 2018-09-27 PROCEDURE — G8420 CALC BMI NORM PARAMETERS: HCPCS | Performed by: INTERNAL MEDICINE

## 2018-09-27 PROCEDURE — 99214 OFFICE O/P EST MOD 30 MIN: CPT | Performed by: INTERNAL MEDICINE

## 2018-09-27 PROCEDURE — G8427 DOCREV CUR MEDS BY ELIG CLIN: HCPCS | Performed by: INTERNAL MEDICINE

## 2018-09-27 PROCEDURE — 1036F TOBACCO NON-USER: CPT | Performed by: INTERNAL MEDICINE

## 2018-09-27 PROCEDURE — 4040F PNEUMOC VAC/ADMIN/RCVD: CPT | Performed by: INTERNAL MEDICINE

## 2018-09-27 PROCEDURE — 1123F ACP DISCUSS/DSCN MKR DOCD: CPT | Performed by: INTERNAL MEDICINE

## 2018-09-27 NOTE — PROGRESS NOTES
Radha Haro MD        OFFICE  FOLLOWUP NOTE    Chief complaints: patient is here for management of hypothyroidism, HTN, AFlutter s/p ablation, DYSLPIDEMIA, cardiomyopathy    Subjective: patient feels better, no chest pain, no shortness of breath, no dizziness, no palpitations    HPI Dimitris Denton is a 80 y. o.year old who  has a past medical history of Acute renal failure (ARF) ([facility] [address] Arthritis; Cancer ([facility] [address] Chronic combined systolic and diastolic congestive heart failure ([facility] [address] Chronic kidney disease; Chronic kidney disease (CKD); DVT (deep venous thrombosis) ([facility] [address] H/O anemia of chronic renal failure; H/O CHF; H/O percutaneous left heart catheterization; History of blood transfusion; History of Holter monitoring; History of nuclear stress test; Hx of blood clots; Hx of echocardiogram; HX OTHER MEDICAL; Hyperlipidemia; Hypertension; Hypothyroidism; and Thyroid disease. and presents for management of hypothyroidism, HTN, AFlutter s/p ablation, DYSLPIDEMIA, cardiomyopathy  which are well controlled  Patient has stopped florinef, clonidine and norvasc, feels great.     Current Outpatient Prescriptions   Medication Sig Dispense Refill    pantoprazole (PROTONIX) 40 MG tablet Take 40 mg by mouth daily      rosuvastatin (CRESTOR) 5 MG tablet Take 5 mg by mouth daily      fludrocortisone (FLORINEF) 0.1 MG tablet Take 0.1 mg by mouth daily      ondansetron (ZOFRAN) 4 MG tablet Take 4 mg by mouth every 6 hours as needed for Nausea or Vomiting      aspirin 81 MG chewable tablet Take 1 tablet by mouth daily 30 tablet 0    metoprolol tartrate (LOPRESSOR) 25 MG tablet Take 1 tablet by mouth 2 times daily (Patient taking differently: Take 50 mg by mouth 2 times daily ) 60 tablet 0    warfarin (COUMADIN) 3 MG tablet Take 1 tablet by mouth daily 30 tablet 0    folic acid-pyridoxine-cyancobalamin (FOLTX) 1.13-25-2 MG TABS Take 1 tablet by mouth daily 30 tablet     fluconazole (DIFLUCAN) 200 MG tablet Take 200 mg by mouth daily Once daily for the first 5 days of each month.  allopurinol (ZYLOPRIM) 300 MG tablet Take 300 mg by mouth daily      ketoconazole (NIZORAL) 2 % shampoo Apply topically daily as needed for Itching Apply topically daily as needed.  tobramycin (TOBREX) 0.3 % ophthalmic solution PLACE 1 DROP IN LEFT EYE EVERY 3 HOURS AS NEEDED  0    ranitidine (ZANTAC) 150 MG capsule Take 150 mg by mouth daily as needed       esomeprazole (NEXIUM) 40 MG delayed release capsule Take 40 mg by mouth every morning (before breakfast)      imipramine (TOFRANIL) 50 MG tablet Take 50 mg by mouth nightly      acetaminophen (TYLENOL) 500 MG tablet Take 500 mg by mouth every 6 hours as needed for Pain       levothyroxine (SYNTHROID) 75 MCG tablet   Take 75 mcg by mouth daily   5    timolol (TIMOPTIC) 0.5 % ophthalmic solution   Place 1 drop into the right eye daily   3    pentoxifylline (TRENTAL) 400 MG CR tablet Take 400 mg by mouth 2 times daily      fluticasone (FLONASE) 50 MCG/ACT nasal spray 2 sprays by Nasal route daily as needed        No current facility-administered medications for this visit. Allergies: Patient has no known allergies. Past Medical History:   Diagnosis Date    Acute renal failure (ARF) ([facility] [address]     Arthritis     Cancer ([facility] [address] 1990    Prostate    Chronic combined systolic and diastolic congestive heart failure ([facility] [address] 7/19/2016    Chronic kidney disease     Chronic kidney disease (CKD)     Stage III    DVT (deep venous thrombosis) (HCC)     patient is on coumadin    H/O anemia of chronic renal failure     H/O CHF     H/O percutaneous left heart catheterization 5/28/15    No evidence of hemodynamically significant CAD    History of blood transfusion     History of Holter monitoring     Rhythm was Sinus, ventricular in single and couplet forms. Superventricular ectopics in single,pair and run forms.  History of nuclear stress test 5/13/15    EF 41%.  Dilated cardiomyopathy with reduction of LV function. Mod-Severe superimposed ischemia in Basa, Mid, Apical Inferior regions.  Hx of blood clots     Hx of echocardiogram 08/28/2017    EF35-45%,mild tricuspid regurg,mild to moderate PHTN    HX OTHER MEDICAL 10/09/2017    [id number]%    Hyperlipidemia     Hypertension     Hypothyroidism     Thyroid disease      Past Surgical History:   Procedure Laterality Date    ABDOMEN SURGERY      BLADDER SUSPENSION      CARDIAC CATHETERIZATION  5/28/15    No evidence of hemodynamically significant CAD    COLOSTOMY Right 1989    DILATATION, ESOPHAGUS      JOINT REPLACEMENT      PROSTATECTOMY      REVISION TOTAL HIP ARTHROPLASTY Right 02/28/2017     Family History   Problem Relation Age of Onset    Cancer Father     Cancer Brother     High Blood Pressure Grandchild     High Blood Pressure Daughter      Social History   Substance Use Topics    Smoking status: Former Smoker     Quit date: 9/29/1978    Smokeless tobacco: Never Used      Comment: quit in 1978    Alcohol use No      [unfilled]  Review of Systems:   · Constitutional: No Fever or Weight Loss   · Eyes: No Decreased Vision  · ENT: No Headaches, Hearing Loss or Vertigo  · Cardiovascular: No chest pain, dyspnea on exertion, palpitations or loss of consciousness  · Respiratory: No cough or wheezing    · Gastrointestinal: No abdominal pain, appetite loss, blood in stools, constipation, diarrhea or heartburn  · Genitourinary: No dysuria, trouble voiding, or hematuria  · Musculoskeletal:  No gait disturbance, weakness or joint complaints  · Integumentary: No rash or pruritis  · Neurological: No TIA or stroke symptoms  · Psychiatric: No anxiety or depression  · Endocrine: No malaise, fatigue or temperature intolerance  · Hematologic/Lymphatic: No bleeding problems, blood clots or swollen lymph nodes  · Allergic/Immunologic: No nasal congestion or hives  All systems negative except as marked.    Objective:  /86   Pulse 94   Ht Component Value Date    LABA1C 5.7 06/21/2018     Lab Results   Component Value Date    CHOL 173 06/10/2018    TRIG 91 06/10/2018    HDL 54 06/10/2018    LDLDIRECT 114 (H) 06/10/2018     Lab Results   Component Value Date    ALT 24 08/15/2018    AST 54 (H) 08/15/2018     TSH:  No results found for: TSH    Impression:  Eduardo Bangura is a 80 y. o.year old who  has a past medical history of Acute renal failure (ARF) ([facility] [address] Arthritis; Cancer ([facility] [address] Chronic combined systolic and diastolic congestive heart failure ([facility] [address] Chronic kidney disease; Chronic kidney disease (CKD); [facility] H/O anemia of chronic renal failure; H/O CHF; H/O percutaneous left heart catheterization; History of blood transfusion; History of Holter monitoring; History of nuclear stress test; Hx of blood clots; Hx of echocardiogram; HX OTHER MEDICAL; Hyperlipidemia; Hypertension; Hypothyroidism; and Thyroid disease. and presents with     Plan:  1. Orthostatic symptoms: resolved, after stopping  florinef, clonidine, norvasc, continue bb  2. Cardiomyopathy; nonischemic, resolved, MUGA showed normal LVEF, does not need ICD, SEEN BY EP,continue lopressor 25 mgpo bid, could not tolerate  lisinopril, continue lopressor  3. CKD:OFF lisinopril  and will recommend to see dr. Jocy Carlson to monitor Creatinine. \  4. LEG SWELLING: norvasc stopped, florinef stopped, he may need lasix, will leave it to nephrologist  5. Dyslipidemia:stable, continue statins  6. HTN:  As above will need to adjust medications. Hypothyroidism:stable,continue synthroidAll All labs, medications and tests reviewed, continue all other medications of all above medical condition listed as is.     [unfilled]

## 2018-10-31 ENCOUNTER — HOSPITAL ENCOUNTER (OUTPATIENT)
Age: 83
Discharge: HOME | End: 2018-10-31
Payer: MEDICARE

## 2018-10-31 DIAGNOSIS — E87.5 HYPERKALEMIA: ICD-10-CM

## 2018-10-31 DIAGNOSIS — I48.92 ATRIAL FLUTTER WITH RAPID VENTRICULAR RESPONSE (HCC): ICD-10-CM

## 2018-10-31 DIAGNOSIS — R53.1 WEAKNESS: ICD-10-CM

## 2018-10-31 DIAGNOSIS — N18.30 CHRONIC KIDNEY DISEASE, STAGE III (MODERATE) (HCC): ICD-10-CM

## 2018-10-31 DIAGNOSIS — I95.0 IDIOPATHIC HYPOTENSION: ICD-10-CM

## 2018-10-31 LAB
ALBUMIN SERPL-MCNC: 3.9 GM/DL (ref 3.4–5)
ANION GAP SERPL CALCULATED.3IONS-SCNC: 13 MMOL/L (ref 4–16)
BACTERIA: ABNORMAL /HPF
BASOPHILS ABSOLUTE: 0.2 K/CU MM
BASOPHILS RELATIVE PERCENT: 2.5 % (ref 0–1)
BILIRUBIN URINE: NEGATIVE MG/DL
BLOOD, URINE: ABNORMAL
BUN BLDV-MCNC: 30 MG/DL (ref 6–23)
CALCIUM SERPL-MCNC: 9 MG/DL (ref 8.3–10.6)
CHLORIDE BLD-SCNC: 104 MMOL/L (ref 99–110)
CLARITY: ABNORMAL
CO2: 24 MMOL/L (ref 21–32)
COLOR: YELLOW
CREAT SERPL-MCNC: 2.2 MG/DL (ref 0.9–1.3)
CREATININE URINE: 165.7 MG/DL (ref 39–259)
DIFFERENTIAL TYPE: ABNORMAL
EOSINOPHILS ABSOLUTE: 0.9 K/CU MM
EOSINOPHILS RELATIVE PERCENT: 12.7 % (ref 0–3)
GFR AFRICAN AMERICAN: 34 ML/MIN/1.73M2
GFR NON-AFRICAN AMERICAN: 28 ML/MIN/1.73M2
GLUCOSE BLD-MCNC: 93 MG/DL (ref 70–99)
GLUCOSE, URINE: NEGATIVE MG/DL
HCT VFR BLD CALC: 40.8 % (ref 42–52)
HEMOGLOBIN: 12.4 GM/DL (ref 13.5–18)
IMMATURE NEUTROPHIL %: 0.1 % (ref 0–0.43)
KETONES, URINE: NEGATIVE MG/DL
LEUKOCYTE ESTERASE, URINE: ABNORMAL
LYMPHOCYTES ABSOLUTE: 1.7 K/CU MM
LYMPHOCYTES RELATIVE PERCENT: 23.5 % (ref 24–44)
MCH RBC QN AUTO: 30.3 PG (ref 27–31)
MCHC RBC AUTO-ENTMCNC: 30.4 % (ref 32–36)
MCV RBC AUTO: 99.8 FL (ref 78–100)
MONOCYTES ABSOLUTE: 0.8 K/CU MM
MONOCYTES RELATIVE PERCENT: 10.9 % (ref 0–4)
MUCUS: ABNORMAL HPF
NITRITE URINE, QUANTITATIVE: POSITIVE
NUCLEATED RBC %: 0 %
PDW BLD-RTO: 13.7 % (ref 11.7–14.9)
PH, URINE: 5 (ref 5–8)
PHOSPHORUS: 3.5 MG/DL (ref 2.5–4.9)
PLATELET # BLD: 239 K/CU MM (ref 140–440)
PMV BLD AUTO: 10.8 FL (ref 7.5–11.1)
POTASSIUM SERPL-SCNC: 4.2 MMOL/L (ref 3.5–5.1)
PROT/CREAT RATIO, UR: 0.3
PROTEIN UA: 30 MG/DL
RBC # BLD: 4.09 M/CU MM (ref 4.6–6.2)
RBC URINE: 6 /HPF (ref 0–3)
SEGMENTED NEUTROPHILS ABSOLUTE COUNT: 3.7 K/CU MM
SEGMENTED NEUTROPHILS RELATIVE PERCENT: 50.3 % (ref 36–66)
SODIUM BLD-SCNC: 141 MMOL/L (ref 135–145)
SPECIFIC GRAVITY UA: 1.02 (ref 1–1.03)
SQUAMOUS EPITHELIAL: 2 /HPF
TOTAL IMMATURE NEUTOROPHIL: 0.01 K/CU MM
TOTAL NUCLEATED RBC: 0 K/CU MM
TRICHOMONAS: ABNORMAL /HPF
URINE TOTAL PROTEIN: 45.2 MG/DL
UROBILINOGEN, URINE: NORMAL MG/DL (ref 0.2–1)
WBC # BLD: 7.3 K/CU MM (ref 4–10.5)
WBC CLUMP: ABNORMAL /HPF
WBC UA: 92 /HPF (ref 0–2)

## 2018-10-31 PROCEDURE — 85025 COMPLETE CBC W/AUTO DIFF WBC: CPT

## 2018-10-31 PROCEDURE — 36415 COLL VENOUS BLD VENIPUNCTURE: CPT

## 2018-10-31 PROCEDURE — 81001 URINALYSIS AUTO W/SCOPE: CPT

## 2018-10-31 PROCEDURE — 84156 ASSAY OF PROTEIN URINE: CPT

## 2018-10-31 PROCEDURE — 82570 ASSAY OF URINE CREATININE: CPT

## 2018-10-31 PROCEDURE — 80069 RENAL FUNCTION PANEL: CPT

## 2018-11-27 ENCOUNTER — OFFICE VISIT (OUTPATIENT)
Dept: CARDIOLOGY CLINIC | Age: 83
End: 2018-11-27
Payer: MEDICARE

## 2018-11-27 VITALS
DIASTOLIC BLOOD PRESSURE: 86 MMHG | WEIGHT: 177.6 LBS | HEART RATE: 64 BPM | SYSTOLIC BLOOD PRESSURE: 134 MMHG | HEIGHT: 71 IN | BODY MASS INDEX: 24.86 KG/M2

## 2018-11-27 DIAGNOSIS — I10 ESSENTIAL HYPERTENSION: Primary | ICD-10-CM

## 2018-11-27 DIAGNOSIS — E78.5 HYPERLIPIDEMIA, UNSPECIFIED HYPERLIPIDEMIA TYPE: ICD-10-CM

## 2018-11-27 PROCEDURE — 1123F ACP DISCUSS/DSCN MKR DOCD: CPT | Performed by: INTERNAL MEDICINE

## 2018-11-27 PROCEDURE — 1036F TOBACCO NON-USER: CPT | Performed by: INTERNAL MEDICINE

## 2018-11-27 PROCEDURE — G8420 CALC BMI NORM PARAMETERS: HCPCS | Performed by: INTERNAL MEDICINE

## 2018-11-27 PROCEDURE — 99214 OFFICE O/P EST MOD 30 MIN: CPT | Performed by: INTERNAL MEDICINE

## 2018-11-27 PROCEDURE — 4040F PNEUMOC VAC/ADMIN/RCVD: CPT | Performed by: INTERNAL MEDICINE

## 2018-11-27 PROCEDURE — G8484 FLU IMMUNIZE NO ADMIN: HCPCS | Performed by: INTERNAL MEDICINE

## 2018-11-27 PROCEDURE — G8427 DOCREV CUR MEDS BY ELIG CLIN: HCPCS | Performed by: INTERNAL MEDICINE

## 2018-11-27 PROCEDURE — 1101F PT FALLS ASSESS-DOCD LE1/YR: CPT | Performed by: INTERNAL MEDICINE

## 2018-11-27 NOTE — PROGRESS NOTES
in normal, teeth, gum and palate are normal, oral mucosa is normal without any notation of pallor or cyanosis  Neck - Supple. No jugular venous distention noted. No carotid bruits, no apical -carotid delay  Respiratory:  Normal breath sounds, No respiratory distress, No wheezing, No chest tenderness. ,no use of accessory muscles, diaphragm movement is normal  Cardiovascular: (PMI) apex non displaced,no lifts no thrills, no s3,no s4, Normal heart rate, Normal rhythm, No murmurs, No rubs, No gallops. Carotid arteries pulse and amplitude are normal no bruit, no abdominal bruit noted ( normal abdominal aorta ausculation), femoral arteries pulse and amplitude are normal no bruit, pedal pulses are normal  Femoral pulses have normal amplitude, no bruits   Extremities - No cyanosis, clubbing, or significant edema, no varicose veins    Abdomen - No masses, tenderness, or organomegaly, no hepato-splenomegally, no bruits  Musculoskeletal - No significant edema, no kyphosis or scoliosis, no deformity in any extremity noted, muscle strength and tone are normal  Skin: no ulcer,no scar,no stasis dermatitis   Neurologic - alert oriented times 3,Cranial nerves II through XII are grossly intact. There were no gross focal neurologic abnormalities. All sensory and motor nerves examined and were normal  Psychiatric: normal mood and affect    Lab Results   Component Value Date    CKTOTAL 253 08/18/2018     BNP:  No results found for: BNP  PT/INR:  No results found for: PTINR  Lab Results   Component Value Date    LABA1C 5.7 06/21/2018     Lab Results   Component Value Date    CHOL 173 06/10/2018    TRIG 91 06/10/2018    HDL 54 06/10/2018    LDLDIRECT 114 (H) 06/10/2018     Lab Results   Component Value Date    ALT 24 08/15/2018    AST 54 (H) 08/15/2018     TSH:  No results found for: TSH    Impression:  Angel Malin is a 80 y. o.year old who  has a past medical history of Acute renal failure (ARF) ([facility] [address] Arthritis; Cancer ([facility] [address]  Chronic

## 2018-12-10 ENCOUNTER — HOSPITAL ENCOUNTER (EMERGENCY)
Age: 83
Discharge: NURSING, REHAB OR LONG-TERM CARE | End: 2018-12-11
Payer: MEDICARE

## 2018-12-10 ENCOUNTER — APPOINTMENT (OUTPATIENT)
Dept: CT IMAGING | Age: 83
End: 2018-12-10
Payer: MEDICARE

## 2018-12-10 DIAGNOSIS — I10 ESSENTIAL HYPERTENSION: ICD-10-CM

## 2018-12-10 DIAGNOSIS — T14.8XXA ABRASION: ICD-10-CM
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
## 2018-12-10 DIAGNOSIS — S09.90XA INJURY OF HEAD, INITIAL ENCOUNTER: ICD-10-CM

## 2018-12-10 PROCEDURE — 99285 EMERGENCY DEPT VISIT HI MDM: CPT

## 2018-12-10 PROCEDURE — 72125 CT NECK SPINE W/O DYE: CPT

## 2018-12-10 PROCEDURE — 70450 CT HEAD/BRAIN W/O DYE: CPT

## 2018-12-11 ENCOUNTER — APPOINTMENT (OUTPATIENT)
Dept: CT IMAGING | Age: 83
DRG: 177 | End: 2018-12-11
Payer: MEDICARE

## 2018-12-11 ENCOUNTER — APPOINTMENT (OUTPATIENT)
Dept: GENERAL RADIOLOGY | Age: 83
DRG: 177 | End: 2018-12-11
Payer: MEDICARE

## 2018-12-11 ENCOUNTER — HOSPITAL ENCOUNTER (INPATIENT)
Age: 83
LOS: 1 days | Discharge: NURSING, REHAB OR LONG-TERM CARE | DRG: 177 | End: 2018-12-14
Attending: EMERGENCY MEDICINE | Admitting: HOSPITALIST
Payer: MEDICARE

## 2018-12-11 VITALS
SYSTOLIC BLOOD PRESSURE: 211 MMHG | HEIGHT: 71 IN | OXYGEN SATURATION: 95 % | TEMPERATURE: 97.8 F | WEIGHT: 170 LBS | DIASTOLIC BLOOD PRESSURE: 93 MMHG | HEART RATE: 106 BPM | BODY MASS INDEX: 23.8 KG/M2 | RESPIRATION RATE: 22 BRPM

## 2018-12-11 DIAGNOSIS — R41.0 DISORIENTATION: ICD-10-CM

## 2018-12-11 DIAGNOSIS — J96.92 RESPIRATORY FAILURE WITH HYPERCAPNIA, UNSPECIFIED CHRONICITY (HCC): Primary | ICD-10-CM

## 2018-12-11 PROBLEM — G93.40 ENCEPHALOPATHY: Status: ACTIVE | Noted: 2018-12-11

## 2018-12-11 LAB
ALBUMIN SERPL-MCNC: 4.2 GM/DL (ref 3.4–5)
ALP BLD-CCNC: 116 IU/L (ref 40–128)
ALT SERPL-CCNC: 16 U/L (ref 10–40)
ANION GAP SERPL CALCULATED.3IONS-SCNC: 9 MMOL/L (ref 4–16)
AST SERPL-CCNC: 18 IU/L (ref 15–37)
BACTERIA: NEGATIVE /HPF
BASE EXCESS MIXED: 2.3 (ref 0–1.2)
BASE EXCESS: 0 (ref 0–3.3)
BASE EXCESS: 1 (ref 0–3.3)
BASOPHILS ABSOLUTE: 0.1 K/CU MM
BASOPHILS RELATIVE PERCENT: 1.5 % (ref 0–1)
BILIRUB SERPL-MCNC: 0.4 MG/DL (ref 0–1)
BILIRUBIN URINE: NEGATIVE MG/DL
BLOOD, URINE: NEGATIVE
BUN BLDV-MCNC: 36 MG/DL (ref 6–23)
CALCIUM SERPL-MCNC: 9.7 MG/DL (ref 8.3–10.6)
CARBON MONOXIDE, BLOOD: 1.8 % (ref 0–5)
CHLORIDE BLD-SCNC: 103 MMOL/L (ref 99–110)
CLARITY: CLEAR
CO2 CONTENT: 26.8 MMOL/L (ref 19–24)
CO2: 29 MMOL/L (ref 21–32)
COLOR: YELLOW
CREAT SERPL-MCNC: 2.6 MG/DL (ref 0.9–1.3)
DIFFERENTIAL TYPE: ABNORMAL
EOSINOPHILS ABSOLUTE: 0.6 K/CU MM
EOSINOPHILS RELATIVE PERCENT: 7.2 % (ref 0–3)
GFR AFRICAN AMERICAN: 28 ML/MIN/1.73M2
GFR NON-AFRICAN AMERICAN: 23 ML/MIN/1.73M2
GLUCOSE BLD-MCNC: 81 MG/DL (ref 70–99)
GLUCOSE, URINE: NEGATIVE MG/DL
HCO3 ARTERIAL: 25.4 MMOL/L (ref 18–23)
HCO3 VENOUS: 27.8 MMOL/L (ref 19–25)
HCO3 VENOUS: 31.4 MMOL/L (ref 19–25)
HCT VFR BLD CALC: 45.9 % (ref 42–52)
HEMOGLOBIN: 14 GM/DL (ref 13.5–18)
IMMATURE NEUTROPHIL %: 0.3 % (ref 0–0.43)
KETONES, URINE: NEGATIVE MG/DL
LACTATE: 1.6 MMOL/L (ref 0.4–2)
LEUKOCYTE ESTERASE, URINE: NEGATIVE
LYMPHOCYTES ABSOLUTE: 1.3 K/CU MM
LYMPHOCYTES RELATIVE PERCENT: 15.4 % (ref 24–44)
MAGNESIUM: 2.3 MG/DL (ref 1.8–2.4)
MCH RBC QN AUTO: 29.5 PG (ref 27–31)
MCHC RBC AUTO-ENTMCNC: 30.5 % (ref 32–36)
MCV RBC AUTO: 96.6 FL (ref 78–100)
METHEMOGLOBIN ARTERIAL: 0.8 %
MONOCYTES ABSOLUTE: 0.8 K/CU MM
MONOCYTES RELATIVE PERCENT: 9.3 % (ref 0–4)
MUCUS: ABNORMAL HPF
NITRITE URINE, QUANTITATIVE: NEGATIVE
NUCLEATED RBC %: 0 %
O2 SAT, VEN: 51.9 % (ref 50–70)
O2 SAT, VEN: 95.7 % (ref 50–70)
O2 SATURATION: 90 % (ref 96–97)
PCO2 ARTERIAL: 45 MMHG (ref 32–45)
PCO2, VEN: 62 MMHG (ref 38–52)
PCO2, VEN: 70 MMHG (ref 38–52)
PDW BLD-RTO: 14 % (ref 11.7–14.9)
PH BLOOD: 7.36 (ref 7.34–7.45)
PH VENOUS: 7.26 (ref 7.32–7.42)
PH VENOUS: 7.26 (ref 7.32–7.42)
PH, URINE: 5 (ref 5–8)
PLATELET # BLD: 265 K/CU MM (ref 140–440)
PMV BLD AUTO: 10.3 FL (ref 7.5–11.1)
PO2 ARTERIAL: 55 MMHG (ref 75–100)
PO2, VEN: 202 MMHG (ref 28–48)
PO2, VEN: 30 MMHG (ref 28–48)
POTASSIUM SERPL-SCNC: 4.7 MMOL/L (ref 3.5–5.1)
PRO-BNP: 985.2 PG/ML
PROTEIN UA: 30 MG/DL
RBC # BLD: 4.75 M/CU MM (ref 4.6–6.2)
RBC URINE: <1 /HPF (ref 0–3)
SEGMENTED NEUTROPHILS ABSOLUTE COUNT: 5.7 K/CU MM
SEGMENTED NEUTROPHILS RELATIVE PERCENT: 66.3 % (ref 36–66)
SODIUM BLD-SCNC: 141 MMOL/L (ref 135–145)
SPECIFIC GRAVITY UA: 1.01 (ref 1–1.03)
SQUAMOUS EPITHELIAL: 1 /HPF
TOTAL IMMATURE NEUTOROPHIL: 0.03 K/CU MM
TOTAL NUCLEATED RBC: 0 K/CU MM
TOTAL PROTEIN: 7.5 GM/DL (ref 6.4–8.2)
TRICHOMONAS: ABNORMAL /HPF
TROPONIN T: <0.01 NG/ML
UROBILINOGEN, URINE: NORMAL MG/DL (ref 0.2–1)
WBC # BLD: 8.6 K/CU MM (ref 4–10.5)
WBC UA: 1 /HPF (ref 0–2)

## 2018-12-11 PROCEDURE — G0378 HOSPITAL OBSERVATION PER HR: HCPCS

## 2018-12-11 PROCEDURE — 70450 CT HEAD/BRAIN W/O DYE: CPT

## 2018-12-11 PROCEDURE — 82803 BLOOD GASES ANY COMBINATION: CPT

## 2018-12-11 PROCEDURE — 83605 ASSAY OF LACTIC ACID: CPT

## 2018-12-11 PROCEDURE — 83735 ASSAY OF MAGNESIUM: CPT

## 2018-12-11 PROCEDURE — 85025 COMPLETE CBC W/AUTO DIFF WBC: CPT

## 2018-12-11 PROCEDURE — 87086 URINE CULTURE/COLONY COUNT: CPT

## 2018-12-11 PROCEDURE — 93010 ELECTROCARDIOGRAM REPORT: CPT | Performed by: INTERNAL MEDICINE

## 2018-12-11 PROCEDURE — 80053 COMPREHEN METABOLIC PANEL: CPT

## 2018-12-11 PROCEDURE — 82805 BLOOD GASES W/O2 SATURATION: CPT

## 2018-12-11 PROCEDURE — 73030 X-RAY EXAM OF SHOULDER: CPT

## 2018-12-11 PROCEDURE — 4500000027

## 2018-12-11 PROCEDURE — 94761 N-INVAS EAR/PLS OXIMETRY MLT: CPT

## 2018-12-11 PROCEDURE — 2580000003 HC RX 258: Performed by: NURSE PRACTITIONER

## 2018-12-11 PROCEDURE — 6370000000 HC RX 637 (ALT 250 FOR IP): Performed by: HOSPITALIST

## 2018-12-11 PROCEDURE — 93005 ELECTROCARDIOGRAM TRACING: CPT | Performed by: EMERGENCY MEDICINE

## 2018-12-11 PROCEDURE — 71045 X-RAY EXAM CHEST 1 VIEW: CPT

## 2018-12-11 PROCEDURE — 2700000000 HC OXYGEN THERAPY PER DAY

## 2018-12-11 PROCEDURE — 36600 WITHDRAWAL OF ARTERIAL BLOOD: CPT

## 2018-12-11 PROCEDURE — 81001 URINALYSIS AUTO W/SCOPE: CPT

## 2018-12-11 PROCEDURE — 87040 BLOOD CULTURE FOR BACTERIA: CPT

## 2018-12-11 PROCEDURE — 83880 ASSAY OF NATRIURETIC PEPTIDE: CPT

## 2018-12-11 PROCEDURE — 84484 ASSAY OF TROPONIN QUANT: CPT

## 2018-12-11 PROCEDURE — 99285 EMERGENCY DEPT VISIT HI MDM: CPT

## 2018-12-11 PROCEDURE — 6370000000 HC RX 637 (ALT 250 FOR IP): Performed by: PHYSICIAN ASSISTANT

## 2018-12-11 PROCEDURE — 36415 COLL VENOUS BLD VENIPUNCTURE: CPT

## 2018-12-11 RX ORDER — IMIPRAMINE HCL 25 MG
50 TABLET ORAL NIGHTLY
Status: DISCONTINUED | OUTPATIENT
Start: 2018-12-11 | End: 2018-12-14 | Stop reason: HOSPADM

## 2018-12-11 RX ORDER — SODIUM CHLORIDE 9 MG/ML
INJECTION, SOLUTION INTRAVENOUS CONTINUOUS
Status: DISPENSED | OUTPATIENT
Start: 2018-12-11 | End: 2018-12-12

## 2018-12-11 RX ORDER — LABETALOL HYDROCHLORIDE 5 MG/ML
10 INJECTION, SOLUTION INTRAVENOUS EVERY 4 HOURS PRN
Status: DISCONTINUED | OUTPATIENT
Start: 2018-12-11 | End: 2018-12-14 | Stop reason: HOSPADM

## 2018-12-11 RX ORDER — TIMOLOL MALEATE 5 MG/ML
1 SOLUTION OPHTHALMIC DAILY
Status: DISCONTINUED | OUTPATIENT
Start: 2018-12-12 | End: 2018-12-14 | Stop reason: HOSPADM

## 2018-12-11 RX ORDER — LEVOTHYROXINE SODIUM 0.07 MG/1
75 TABLET ORAL
Status: DISCONTINUED | OUTPATIENT
Start: 2018-12-12 | End: 2018-12-14 | Stop reason: HOSPADM

## 2018-12-11 RX ORDER — FLUDROCORTISONE ACETATE 0.1 MG/1
0.1 TABLET ORAL DAILY
Status: DISCONTINUED | OUTPATIENT
Start: 2018-12-12 | End: 2018-12-14 | Stop reason: HOSPADM

## 2018-12-11 RX ORDER — ACETAMINOPHEN 500 MG
500 TABLET ORAL EVERY 6 HOURS PRN
Status: DISCONTINUED | OUTPATIENT
Start: 2018-12-11 | End: 2018-12-14 | Stop reason: HOSPADM

## 2018-12-11 RX ORDER — FAMOTIDINE 20 MG/1
20 TABLET, FILM COATED ORAL DAILY
Status: DISCONTINUED | OUTPATIENT
Start: 2018-12-12 | End: 2018-12-14 | Stop reason: HOSPADM

## 2018-12-11 RX ORDER — LATANOPROST 50 UG/ML
1 SOLUTION/ DROPS OPHTHALMIC NIGHTLY
Status: DISCONTINUED | OUTPATIENT
Start: 2018-12-11 | End: 2018-12-14 | Stop reason: HOSPADM

## 2018-12-11 RX ORDER — LEVOFLOXACIN 500 MG/1
500 TABLET, FILM COATED ORAL DAILY
Status: ON HOLD | COMMUNITY
End: 2018-12-14 | Stop reason: HOSPADM

## 2018-12-11 RX ORDER — METOPROLOL TARTRATE 50 MG/1
25 TABLET, FILM COATED ORAL ONCE
Status: COMPLETED | OUTPATIENT
Start: 2018-12-11 | End: 2018-12-11

## 2018-12-11 RX ORDER — ATORVASTATIN CALCIUM 10 MG/1
10 TABLET, FILM COATED ORAL NIGHTLY
Status: DISCONTINUED | OUTPATIENT
Start: 2018-12-11 | End: 2018-12-14 | Stop reason: HOSPADM

## 2018-12-11 RX ORDER — ASPIRIN 81 MG/1
81 TABLET, CHEWABLE ORAL DAILY
Status: DISCONTINUED | OUTPATIENT
Start: 2018-12-12 | End: 2018-12-14 | Stop reason: HOSPADM

## 2018-12-11 RX ORDER — ALLOPURINOL 300 MG/1
300 TABLET ORAL DAILY
Status: DISCONTINUED | OUTPATIENT
Start: 2018-12-12 | End: 2018-12-12

## 2018-12-11 RX ORDER — PENTOXIFYLLINE 400 MG/1
400 TABLET, EXTENDED RELEASE ORAL 2 TIMES DAILY WITH MEALS
Status: DISCONTINUED | OUTPATIENT
Start: 2018-12-12 | End: 2018-12-14 | Stop reason: HOSPADM

## 2018-12-11 RX ADMIN — LATANOPROST 1 DROP: 50 SOLUTION OPHTHALMIC at 20:31

## 2018-12-11 RX ADMIN — IMIPRAMINE HYDROCHLORIDE 50 MG: 25 TABLET ORAL at 20:31

## 2018-12-11 RX ADMIN — METOPROLOL TARTRATE 25 MG: 50 TABLET ORAL at 00:22

## 2018-12-11 RX ADMIN — SODIUM CHLORIDE: 9 INJECTION, SOLUTION INTRAVENOUS at 20:24

## 2018-12-11 RX ADMIN — ATORVASTATIN CALCIUM 10 MG: 10 TABLET, FILM COATED ORAL at 20:24

## 2018-12-11 NOTE — ED PROVIDER NOTES
into the right eye daily   3    pentoxifylline (TRENTAL) 400 MG CR tablet Take 400 mg by mouth 2 times daily      fluticasone (FLONASE) 50 MCG/ACT nasal spray 2 sprays by Nasal route daily as needed          ALLERGIES    No Known Allergies    SOCIAL & FAMILY HISTORY    Social History     Social History    Marital status:      Spouse name: N/A    Number of children: N/A    Years of education: N/A     Social History Main Topics    Smoking status: Former Smoker     Quit date: 9/29/1978    Smokeless tobacco: Never Used      Comment: quit in 1978    Alcohol use No    Drug use: No    Sexual activity: Not Asked     Other Topics Concern    None     Social History Narrative    None     Family History   Problem Relation Age of Onset    Cancer Father     Cancer Brother     High Blood Pressure Grandchild     High Blood Pressure Daughter        PHYSICAL EXAM    VITAL SIGNS: Pulse 102   Temp 97.8 °F (36.6 °C) (Oral)   Resp 18   Ht 5' 11\" (1.803 m)   Wt 170 lb (77.1 kg)   SpO2 96%   BMI 23.71 kg/m²    Constitutional:  Well-developed, well-nourished, no acute distress   Eyes: Irregular right pupil--not acute. HENT:  NC/AT. Ears normal, no Andrew sign. Nares patent. Oropharynx moist..  Neck:  No tenderness, normal ROM. Respiratory:  Lungs CTAB. Cardiovascular:  Reg rate, reg rhythm. GI:  Soft, non-tender. BS active. Musculoskeletal:  No edema, no deformities or tenderness. Vascular:  DP intact. Integument:  Warm and dry. Superficial abrasion to lateral left eyebrow. Neurologic:  Alert and oriented. Psych: Very pleasant affect. RADIOLOGY/PROCEDURES    Ct Head Wo Contrast    Result Date: 12/10/2018  No acute intracranial abnormality. Chronic microvascular ischemic changes with remote lacunar infarcts within the bilateral basal ganglia and the bilateral thalami. Ct Cervical Spine Wo Contrast    Result Date: 12/10/2018  No acute abnormality of the cervical spine.      ED COURSE & MEDICAL

## 2018-12-11 NOTE — ED PROVIDER NOTES
compatible with atelectasis versus scarring. No acute osseous abnormality of the thorax identified. Left shoulder: Three views of the left shoulder were reviewed. No acute fracture or dislocation is evident. Alignment appears anatomic. There are moderate degenerative changes of the left glenohumeral and acromioclavicular joints. 1. No acute cardiopulmonary process identified. Left basilar atelectasis versus scarring. 2. No acute osseous abnormality of the left shoulder evident. 3. 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 Xr Chest Portable    Result Date: 12/11/2018  EXAMINATION: SINGLE XRAY VIEW OF THE CHEST; 3 XRAY VIEWS OF THE LEFT SHOULDER 12/11/2018 1:04 pm; 12/11/2018 1:32 pm COMPARISON: Chest x-ray August 17, 2018 HISTORY: ORDERING SYSTEM PROVIDED HISTORY: chest pain TECHNOLOGIST PROVIDED HISTORY: Reason for exam:->chest pain Ordering Physician Provided Reason for Exam: chest pain Acuity: Unknown Type of Exam: Unknown Relevant Medical/Surgical History: chf; ORDERING SYSTEM PROVIDED HISTORY: trauma TECHNOLOGIST PROVIDED HISTORY: Reason for exam:->trauma Ordering Physician Provided Reason for Exam: left shoulder pain Acuity: Unknown Type of Exam: Unknown Additional signs and symptoms: left shoulder pain FINDINGS: Chest x-ray: Cardiac silhouette is stable. No focal consolidation, pleural effusion, or pneumothorax. Mild linear opacities at the left lung base most compatible with atelectasis versus scarring. No acute osseous abnormality of the thorax identified. Left shoulder: Three views of the left shoulder were reviewed. No acute fracture or dislocation is evident. Alignment appears anatomic. There are moderate degenerative changes of the left glenohumeral and acromioclavicular joints. 1. No acute cardiopulmonary process identified. Left basilar atelectasis versus scarring. 2. No acute osseous abnormality of the left shoulder evident.  3. Moderate left glenohumeral

## 2018-12-11 NOTE — ED NOTES
XR SHOULDER LEFT (MIN 2 VIEWS)   Status: Final result   Order Providers     Authorizing Billing   DO Jessica Finn MD          Signed by     Signed Date/Time  Phone Pager   Nabil Bring 12/11/2018 13:47 [phone]    Reading Radiologists     Read Date Phone Pager   Nabil Bring Dec 11, 2018 [phone]    Radiation Dose Estimates     No radiation information found for this patient   Narrative   EXAMINATION:   SINGLE XRAY VIEW OF THE CHEST; 3 XRAY VIEWS OF THE LEFT SHOULDER       12/11/2018 1:04 pm; 12/11/2018 1:32 pm       COMPARISON:   Chest x-ray August 17, 2018       HISTORY:   ORDERING SYSTEM PROVIDED HISTORY: chest pain   TECHNOLOGIST PROVIDED HISTORY:   Reason for exam:->chest pain   Ordering Physician Provided Reason for Exam: chest pain   Acuity: Unknown   Type of Exam: Unknown   Relevant Medical/Surgical History: chf; ORDERING SYSTEM PROVIDED HISTORY:   trauma   TECHNOLOGIST PROVIDED HISTORY:   Reason for exam:->trauma   Ordering Physician Provided Reason for Exam: left shoulder pain   Acuity: Unknown   Type of Exam: Unknown   Additional signs and symptoms: left shoulder pain       FINDINGS:   Chest x-ray: Cardiac silhouette is stable.  No focal consolidation, pleural   effusion, or pneumothorax.  Mild linear opacities at the left lung base most   compatible with atelectasis versus scarring.  No acute osseous abnormality of   the thorax identified.       Left shoulder: Three views of the left shoulder were reviewed.  No acute   fracture or dislocation is evident.  Alignment appears anatomic.  There are   moderate degenerative changes of the left glenohumeral and acromioclavicular   joints.           Impression   1. No acute cardiopulmonary process identified.  Left basilar atelectasis   versus scarring. 2. No acute osseous abnormality of the left shoulder evident.    3. 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              Edna Castrejon RN  12/11/18 [id number]

## 2018-12-11 NOTE — ED NOTES
Went to ask DR. Thorpe to clarify order for family. Came back to patient room. Lab drawing blood and tech getting urine sample. Will check back later.

## 2018-12-11 NOTE — PROGRESS NOTES
capsule Take 150 mg by mouth daily as needed    Yes Historical Provider, MD   imipramine (TOFRANIL) 50 MG tablet Take 50 mg by mouth nightly   Yes Historical Provider, MD   acetaminophen (TYLENOL) 500 MG tablet Take 500 mg by mouth every 6 hours as needed for Pain    Yes Historical Provider, MD   levothyroxine (SYNTHROID) 75 MCG tablet   Take 75 mcg by mouth daily  4/14/15  Yes Historical Provider, MD   timolol (TIMOPTIC) 0.5 % ophthalmic solution Place 1 drop into both eyes daily  2/10/15  Yes Historical Provider, MD   pentoxifylline (TRENTAL) 400 MG CR tablet Take 400 mg by mouth 2 times daily   Yes Historical Provider, MD   fluticasone (FLONASE) 50 MCG/ACT nasal spray 2 sprays by Nasal route daily as needed    Yes Historical Provider, MD     Medications added or changed (ex. new medication, dosage change, interval change, formulation change): · Levaquin 500 mg daily (added)    Medications removed from list (include reason, ex. noncompliance, medication cost, therapy complete etc.):   · Pantoprazole d/'c'd per facility    Other Comments:  ·  [facility]   · Warfarin dosing updated per information received. Patient takes 2 mg on the 1st through 6th of each month and 3 mg through the rest of the month. Per nursing patient takes at HS last dose 12/10/18 of 3 mg.   · Per nursing facility patient only to be on Metoprolol tartrate 25 mg BID with a stop date of 12/13/18    To my knowledge the above medication history is accurate as of 12/11/2018 4:18 PM.   Panchito Broussard CPhT   12/11/2018 4:18 PM

## 2018-12-11 NOTE — ED NOTES
Pt complaining on left shoulder now. No obvious deformity or crepitus noted.       Justyna Reese RN  12/11/18 [id number]

## 2018-12-12 ENCOUNTER — APPOINTMENT (OUTPATIENT)
Dept: MRI IMAGING | Age: 83
DRG: 177 | End: 2018-12-12
Payer: MEDICARE

## 2018-12-12 ENCOUNTER — APPOINTMENT (OUTPATIENT)
Dept: GENERAL RADIOLOGY | Age: 83
DRG: 177 | End: 2018-12-12
Payer: MEDICARE

## 2018-12-12 LAB
ANION GAP SERPL CALCULATED.3IONS-SCNC: 8 MMOL/L (ref 4–16)
BUN BLDV-MCNC: 39 MG/DL (ref 6–23)
CALCIUM SERPL-MCNC: 8.7 MG/DL (ref 8.3–10.6)
CHLORIDE BLD-SCNC: 106 MMOL/L (ref 99–110)
CO2: 29 MMOL/L (ref 21–32)
CREAT SERPL-MCNC: 3 MG/DL (ref 0.9–1.3)
GFR AFRICAN AMERICAN: 24 ML/MIN/1.73M2
GFR NON-AFRICAN AMERICAN: 20 ML/MIN/1.73M2
GLUCOSE BLD-MCNC: 119 MG/DL (ref 70–99)
POTASSIUM SERPL-SCNC: 4.5 MMOL/L (ref 3.5–5.1)
SODIUM BLD-SCNC: 143 MMOL/L (ref 135–145)

## 2018-12-12 PROCEDURE — 70551 MRI BRAIN STEM W/O DYE: CPT

## 2018-12-12 PROCEDURE — G0378 HOSPITAL OBSERVATION PER HR: HCPCS

## 2018-12-12 PROCEDURE — 6370000000 HC RX 637 (ALT 250 FOR IP): Performed by: HOSPITALIST

## 2018-12-12 PROCEDURE — 6360000002 HC RX W HCPCS: Performed by: INTERNAL MEDICINE

## 2018-12-12 PROCEDURE — 71046 X-RAY EXAM CHEST 2 VIEWS: CPT

## 2018-12-12 PROCEDURE — 96372 THER/PROPH/DIAG INJ SC/IM: CPT

## 2018-12-12 PROCEDURE — 80048 BASIC METABOLIC PNL TOTAL CA: CPT

## 2018-12-12 PROCEDURE — 94761 N-INVAS EAR/PLS OXIMETRY MLT: CPT

## 2018-12-12 PROCEDURE — 36415 COLL VENOUS BLD VENIPUNCTURE: CPT

## 2018-12-12 PROCEDURE — 2700000000 HC OXYGEN THERAPY PER DAY

## 2018-12-12 RX ORDER — ALLOPURINOL 100 MG/1
200 TABLET ORAL DAILY
Status: DISCONTINUED | OUTPATIENT
Start: 2018-12-13 | End: 2018-12-14 | Stop reason: HOSPADM

## 2018-12-12 RX ORDER — HEPARIN SODIUM 5000 [USP'U]/ML
5000 INJECTION, SOLUTION INTRAVENOUS; SUBCUTANEOUS 2 TIMES DAILY
Status: DISCONTINUED | OUTPATIENT
Start: 2018-12-12 | End: 2018-12-14 | Stop reason: HOSPADM

## 2018-12-12 RX ADMIN — PENTOXIFYLLINE 400 MG: 400 TABLET, EXTENDED RELEASE ORAL at 16:38

## 2018-12-12 RX ADMIN — ATORVASTATIN CALCIUM 10 MG: 10 TABLET, FILM COATED ORAL at 20:09

## 2018-12-12 RX ADMIN — PENTOXIFYLLINE 400 MG: 400 TABLET, EXTENDED RELEASE ORAL at 08:56

## 2018-12-12 RX ADMIN — ALLOPURINOL 300 MG: 300 TABLET ORAL at 08:56

## 2018-12-12 RX ADMIN — FLUDROCORTISONE ACETATE 0.1 MG: 0.1 TABLET ORAL at 08:57

## 2018-12-12 RX ADMIN — LATANOPROST 1 DROP: 50 SOLUTION OPHTHALMIC at 20:09

## 2018-12-12 RX ADMIN — FAMOTIDINE 20 MG: 20 TABLET ORAL at 08:56

## 2018-12-12 RX ADMIN — LEVOTHYROXINE SODIUM 75 MCG: 75 TABLET ORAL at 06:41

## 2018-12-12 RX ADMIN — ASPIRIN 81 MG 81 MG: 81 TABLET ORAL at 08:56

## 2018-12-12 RX ADMIN — IMIPRAMINE HYDROCHLORIDE 50 MG: 25 TABLET ORAL at 20:09

## 2018-12-12 RX ADMIN — HEPARIN SODIUM 5000 UNITS: 5000 INJECTION, SOLUTION INTRAVENOUS; SUBCUTANEOUS at 20:09

## 2018-12-12 RX ADMIN — TIMOLOL MALEATE 1 DROP: 5 SOLUTION, GEL FORMING, EXTENDED RELEASE OPHTHALMIC at 08:57

## 2018-12-13 LAB
ADENOVIRUS DETECTION BY PCR: NOT DETECTED
ANION GAP SERPL CALCULATED.3IONS-SCNC: 9 MMOL/L (ref 4–16)
BORDETELLA PERTUSSIS PCR: NOT DETECTED
BUN BLDV-MCNC: 35 MG/DL (ref 6–23)
CALCIUM SERPL-MCNC: 8.7 MG/DL (ref 8.3–10.6)
CHLAMYDOPHILA PNEUMONIA PCR: NOT DETECTED
CHLORIDE BLD-SCNC: 106 MMOL/L (ref 99–110)
CO2: 27 MMOL/L (ref 21–32)
CORONAVIRUS 229E PCR: NOT DETECTED
CORONAVIRUS HKU1 PCR: NOT DETECTED
CORONAVIRUS NL63 PCR: NOT DETECTED
CORONAVIRUS OC43 PCR: NOT DETECTED
CREAT SERPL-MCNC: 2.3 MG/DL (ref 0.9–1.3)
CULTURE: NORMAL
GFR AFRICAN AMERICAN: 32 ML/MIN/1.73M2
GFR NON-AFRICAN AMERICAN: 27 ML/MIN/1.73M2
GLUCOSE BLD-MCNC: 101 MG/DL (ref 70–99)
HCT VFR BLD CALC: 36.3 % (ref 42–52)
HEMOGLOBIN: 11.1 GM/DL (ref 13.5–18)
HUMAN METAPNEUMOVIRUS PCR: NOT DETECTED
INFLUENZA A BY PCR: NOT DETECTED
INFLUENZA A H1 (2009) PCR: NOT DETECTED
INFLUENZA A H1 PANDEMIC PCR: NOT DETECTED
INFLUENZA A H3 PCR: NOT DETECTED
INFLUENZA B BY PCR: NOT DETECTED
Lab: NORMAL
MCH RBC QN AUTO: 29.5 PG (ref 27–31)
MCHC RBC AUTO-ENTMCNC: 30.6 % (ref 32–36)
MCV RBC AUTO: 96.5 FL (ref 78–100)
MYCOPLASMA PNEUMONIAE PCR: NOT DETECTED
PARAINFLUENZA 1 PCR: NOT DETECTED
PARAINFLUENZA 2 PCR: NOT DETECTED
PARAINFLUENZA 3 PCR: NOT DETECTED
PARAINFLUENZA 4 PCR: NOT DETECTED
PDW BLD-RTO: 14.1 % (ref 11.7–14.9)
PLATELET # BLD: 201 K/CU MM (ref 140–440)
PMV BLD AUTO: 10.1 FL (ref 7.5–11.1)
POTASSIUM SERPL-SCNC: 4.2 MMOL/L (ref 3.5–5.1)
RBC # BLD: 3.76 M/CU MM (ref 4.6–6.2)
REPORT STATUS: NORMAL
RHINOVIRUS ENTEROVIRUS PCR: NOT DETECTED
RSV PCR: NOT DETECTED
SODIUM BLD-SCNC: 142 MMOL/L (ref 135–145)
SPECIMEN: NORMAL
WBC # BLD: 6.4 K/CU MM (ref 4–10.5)

## 2018-12-13 PROCEDURE — G0378 HOSPITAL OBSERVATION PER HR: HCPCS

## 2018-12-13 PROCEDURE — 1200000000 HC SEMI PRIVATE

## 2018-12-13 PROCEDURE — 6370000000 HC RX 637 (ALT 250 FOR IP): Performed by: HOSPITALIST

## 2018-12-13 PROCEDURE — 87804 INFLUENZA ASSAY W/OPTIC: CPT

## 2018-12-13 PROCEDURE — 96365 THER/PROPH/DIAG IV INF INIT: CPT

## 2018-12-13 PROCEDURE — 2500000003 HC RX 250 WO HCPCS: Performed by: INTERNAL MEDICINE

## 2018-12-13 PROCEDURE — 36415 COLL VENOUS BLD VENIPUNCTURE: CPT

## 2018-12-13 PROCEDURE — 96372 THER/PROPH/DIAG INJ SC/IM: CPT

## 2018-12-13 PROCEDURE — G8996 SWALLOW CURRENT STATUS: HCPCS

## 2018-12-13 PROCEDURE — G8979 MOBILITY GOAL STATUS: HCPCS

## 2018-12-13 PROCEDURE — 6370000000 HC RX 637 (ALT 250 FOR IP): Performed by: INTERNAL MEDICINE

## 2018-12-13 PROCEDURE — 97530 THERAPEUTIC ACTIVITIES: CPT

## 2018-12-13 PROCEDURE — 97535 SELF CARE MNGMENT TRAINING: CPT

## 2018-12-13 PROCEDURE — 85027 COMPLETE CBC AUTOMATED: CPT

## 2018-12-13 PROCEDURE — 96366 THER/PROPH/DIAG IV INF ADDON: CPT

## 2018-12-13 PROCEDURE — G8978 MOBILITY CURRENT STATUS: HCPCS

## 2018-12-13 PROCEDURE — 97162 PT EVAL MOD COMPLEX 30 MIN: CPT

## 2018-12-13 PROCEDURE — 97167 OT EVAL HIGH COMPLEX 60 MIN: CPT

## 2018-12-13 PROCEDURE — 80048 BASIC METABOLIC PNL TOTAL CA: CPT

## 2018-12-13 PROCEDURE — G8997 SWALLOW GOAL STATUS: HCPCS

## 2018-12-13 PROCEDURE — 6360000002 HC RX W HCPCS: Performed by: INTERNAL MEDICINE

## 2018-12-13 PROCEDURE — G8988 SELF CARE GOAL STATUS: HCPCS

## 2018-12-13 PROCEDURE — G8987 SELF CARE CURRENT STATUS: HCPCS

## 2018-12-13 PROCEDURE — 87798 DETECT AGENT NOS DNA AMP: CPT

## 2018-12-13 PROCEDURE — 2700000000 HC OXYGEN THERAPY PER DAY

## 2018-12-13 PROCEDURE — 94761 N-INVAS EAR/PLS OXIMETRY MLT: CPT

## 2018-12-13 PROCEDURE — 92610 EVALUATE SWALLOWING FUNCTION: CPT

## 2018-12-13 PROCEDURE — 94640 AIRWAY INHALATION TREATMENT: CPT

## 2018-12-13 RX ORDER — IPRATROPIUM BROMIDE AND ALBUTEROL SULFATE 2.5; .5 MG/3ML; MG/3ML
1 SOLUTION RESPIRATORY (INHALATION)
Status: DISCONTINUED | OUTPATIENT
Start: 2018-12-13 | End: 2018-12-14 | Stop reason: HOSPADM

## 2018-12-13 RX ADMIN — PENTOXIFYLLINE 400 MG: 400 TABLET, EXTENDED RELEASE ORAL at 16:55

## 2018-12-13 RX ADMIN — HEPARIN SODIUM 5000 UNITS: 5000 INJECTION, SOLUTION INTRAVENOUS; SUBCUTANEOUS at 09:07

## 2018-12-13 RX ADMIN — PENTOXIFYLLINE 400 MG: 400 TABLET, EXTENDED RELEASE ORAL at 09:07

## 2018-12-13 RX ADMIN — LATANOPROST 1 DROP: 50 SOLUTION OPHTHALMIC at 22:58

## 2018-12-13 RX ADMIN — ATORVASTATIN CALCIUM 10 MG: 10 TABLET, FILM COATED ORAL at 22:58

## 2018-12-13 RX ADMIN — TIMOLOL MALEATE 1 DROP: 5 SOLUTION, GEL FORMING, EXTENDED RELEASE OPHTHALMIC at 11:57

## 2018-12-13 RX ADMIN — IPRATROPIUM BROMIDE AND ALBUTEROL SULFATE 1 AMPULE: .5; 3 SOLUTION RESPIRATORY (INHALATION) at 12:50

## 2018-12-13 RX ADMIN — IMIPRAMINE HYDROCHLORIDE 50 MG: 25 TABLET ORAL at 22:58

## 2018-12-13 RX ADMIN — HEPARIN SODIUM 5000 UNITS: 5000 INJECTION, SOLUTION INTRAVENOUS; SUBCUTANEOUS at 22:58

## 2018-12-13 RX ADMIN — LEVOTHYROXINE SODIUM 75 MCG: 75 TABLET ORAL at 06:08

## 2018-12-13 RX ADMIN — ASPIRIN 81 MG 81 MG: 81 TABLET ORAL at 09:08

## 2018-12-13 RX ADMIN — FAMOTIDINE 20 MG: 20 TABLET ORAL at 09:08

## 2018-12-13 RX ADMIN — ALLOPURINOL 200 MG: 100 TABLET ORAL at 09:07

## 2018-12-13 RX ADMIN — TAZOBACTAM SODIUM AND PIPERACILLIN SODIUM 3.38 G: 375; 3 INJECTION, SOLUTION INTRAVENOUS at 22:58

## 2018-12-13 RX ADMIN — IPRATROPIUM BROMIDE AND ALBUTEROL SULFATE 1 AMPULE: .5; 3 SOLUTION RESPIRATORY (INHALATION) at 15:38

## 2018-12-13 RX ADMIN — IPRATROPIUM BROMIDE AND ALBUTEROL SULFATE 1 AMPULE: .5; 3 SOLUTION RESPIRATORY (INHALATION) at 20:11

## 2018-12-13 RX ADMIN — TAZOBACTAM SODIUM AND PIPERACILLIN SODIUM 3.38 G: 375; 3 INJECTION, SOLUTION INTRAVENOUS at 11:57

## 2018-12-13 RX ADMIN — FLUDROCORTISONE ACETATE 0.1 MG: 0.1 TABLET ORAL at 09:07

## 2018-12-13 ASSESSMENT — PAIN DESCRIPTION - PAIN TYPE: TYPE: ACUTE PAIN

## 2018-12-13 ASSESSMENT — PAIN SCALES - GENERAL
PAINLEVEL_OUTOF10: 3
PAINLEVEL_OUTOF10: 0

## 2018-12-13 ASSESSMENT — PAIN DESCRIPTION - LOCATION: LOCATION: BACK

## 2018-12-13 NOTE — PROGRESS NOTES
Occupational Therapy   Occupational Therapy Initial Assessment  Date: 2018   Patient Name: Dell Sullivan  MRN: [id number]     : 10/28/1927    Date of Service: 2018    Discharge Recommendations:  [address]       Patient Diagnosis(es): The primary encounter diagnosis was Respiratory failure with hypercapnia, unspecified chronicity ([facility] [address] A diagnosis of Disorientation was also pertinent to this visit. has a past medical history of Acute renal failure (ARF) ([facility] [address] Arthritis; Cancer ([facility] [address] Cerebral artery occlusion with cerebral infarction ([facility] [address] Chronic combined systolic and diastolic congestive heart failure ([facility] [address] Chronic kidney disease; Chronic kidney disease (CKD); Cognitive communication deficit; Depression; Difficulty in walking; DVT (deep venous thrombosis) ([facility] [address] H/O anemia of chronic renal failure; H/O CHF; H/O percutaneous left heart catheterization; History of blood transfusion; History of Holter monitoring; History of nuclear stress test; Hx of blood clots; Hx of echocardiogram; HX OTHER MEDICAL; Hyperlipidemia; Hypertension; Hypothyroidism; Pneumonia; and Thyroid disease. has a past surgical history that includes Abdomen surgery; Prostatectomy; joint replacement; Dilatation, esophagus; colostomy (Right, ); bladder suspension; Cardiac catheterization (5/28/15); and Revision total hip arthroplasty (Right, 2017).       Treatment Diagnosis: Acute encephalopathy, Acute respiratory failure, Pneumonia        Restrictions  Restrictions/Precautions  Restrictions/Precautions: General Precautions, Fall Risk  Position Activity Restriction  Other position/activity restrictions: Telemetry, Pulse Ox, BP cuff, 3L o2, Bed/chair alarm      Subjective   General  Chart Reviewed: Yes  Patient assessed for rehabilitation services?: Yes  Family / Caregiver Present: No  Subjective  Subjective: Pt received in supine upon OT arrival. Pt agreeable to therapy eval.  Pain Assessment  Patient Currently in Pain: Denies       Social/Functional History  Social/Functional History  Lives With: Alone  Type of Home: Assisted living ([facility])  Home Layout: One level  Home Access: Level entry  Bathroom Shower/Tub: Walk-in shower  Bathroom Toilet: Handicap height  Bathroom Equipment: Shower chair, Grab bars in shower, Grab bars around toilet  Bathroom Accessibility: Chew Arian: 4 wheeled walker  Receives Help From: Personal care attendant  ADL Assistance: Needs assistance (staff assist with bathing/dressing.  Able to manage toileting)  Homemaking Assistance: Needs assistance  Homemaking Responsibilities: No  Ambulation Assistance: Independent (mod I with 4ww)  Transfer Assistance: Independent  Active : No  Patient's  Info: family will transport patient  Occupation: Retired  Type of occupation: [employer]  Leisure & Hobbies: Watching [employer] shows  Additional Comments: Pt reports a fall when getting into bed earlier this week         Objective   Vision: Within Functional Limits (Glasses, able to see numbers on board)  Hearing: Within functional limits    Orientation  Overall Orientation Status: Impaired  Orientation Level: Disoriented to time (Pt able to state month and exact day of month but unable to state year)  Observation/Palpation  Posture: Fair      Balance  Sitting Balance: Stand by assistance  Standing Balance: Minimal assistance (with RW, ranged from CGA to min A, midly retropulsive with fatigue)  Standing Balance  Sit to stand: Minimal assistance (with min cues for scooting hips forward and pushing through arms)  Stand to sit: Minimal assistance (with min cues for feeling legs against chair and reaching back with arms)  Functional Mobility  Functional - Mobility Device: Rolling Walker  Activity: To/from bathroom  Assist Level: Minimal assistance  Functional Mobility Comments: Household distance to bathroom for toileting; unsteady gait, need for safety  Problem Solving: Assistance required to identify errors made;Assistance required to correct errors made  Insights: Decreased awareness of deficits  Cognition Comment: Pt with delayed thought processing, decreased recall of recent events/apartment setup, decreased insight/safety awareness           Sensation  Overall Sensation Status: WFL  LUE AROM (degrees)  LUE AROM : WFL  RUE AROM (degrees)  RUE AROM : WFL  LUE Strength  Gross LUE Strength: WFL  L Hand Grasp: 4+/5  LUE Strength Comment: Grossly 4 to 4+/5 MMT all major muscle groups  RUE Strength  Gross RUE Strength: WFL  R Hand Grasp: 4+/5  RUE Strength Comment: Grossly 4 to 4+/5 MMT all major muscle groups       Self Care Training:   Cues were given for safety, sequence, UE/LE placement, visual cues, and balance. Activities performed today included UB/LB dressing tasks, toileting, hand hygiene, and oral hygiene at sink    AM-PAC 6 click short form for inpatient daily activity:   How much help from another person does the patient currently need. .. Unable  Dep A Lot  Max A A Lot   Mod A A Little  Min A A Little   CGA  SBA None   Mod I  Indep  Sup   1. Putting on and taking off regular lower body clothing? [] 1    [x] 2   [] 2   [] 3   [] 3   [] 4      2. Bathing (including washing, rinsing, drying)? [] 1   [] 2   [x] 2 [] 3 [] 3 [] 4   3. Toileting, which includes using toilet, bedpan, or urinal? [] 1    [] 2   [x] 2   [] 3   [] 3   [] 4     4. Putting on and taking off regular upper body clothing? [] 1   [] 2   [] 2   [] 3   [x] 3    [] 4      5. Taking care of personal grooming such as brushing teeth? [] 1   [] 2    [] 2 [x] 3    [] 3   [] 4      6. Eating meals? [] 1   [] 2   [] 2   [] 3   [x] 3   [] 4      Raw Score:  15   [24=0% impaired(CH), 23=1-19%(CI), 20-22=20-39%(CJ), 15-19=40-59%(CK), 10-14=60-79%(CL), 7-9=80-99%(CM), 6=100%(CN)]        Assessment   Performance deficits / Impairments: Decreased functional mobility ; Decreased ADL

## 2018-12-13 NOTE — PLAN OF CARE
Problem: Falls - Risk of:  Goal: Will remain free from falls  Will remain free from falls   Outcome: Ongoing    Goal: Absence of physical injury  Absence of physical injury   Outcome: Ongoing      Problem: Risk for Impaired Skin Integrity  Goal: Tissue integrity - skin and mucous membranes  Structural intactness and normal physiological function of skin and  mucous membranes.    Outcome: Ongoing      Problem: Infection:  Goal: Will remain free from infection  Will remain free from infection   Outcome: Ongoing      Problem: Safety:  Goal: Free from accidental physical injury  Free from accidental physical injury   Outcome: Ongoing    Goal: Free from intentional harm  Free from intentional harm   Outcome: Ongoing      Problem: Daily Care:  Goal: Daily care needs are met  Daily care needs are met   Outcome: Ongoing      Problem: Pain:  Goal: Patient's pain/discomfort is manageable  Patient's pain/discomfort is manageable   Outcome: Ongoing    Goal: Pain level will decrease  Pain level will decrease   Outcome: Ongoing    Goal: Control of acute pain  Control of acute pain   Outcome: Ongoing    Goal: Control of chronic pain  Control of chronic pain   Outcome: Ongoing      Problem: Discharge Planning:  Goal: Patients continuum of care needs are met  Patients continuum of care needs are met   Outcome: Ongoing      Problem: HEMODYNAMIC STATUS  Goal: Patient has stable vital signs and fluid balance  Outcome: Ongoing      Problem: ACTIVITY INTOLERANCE/IMPAIRED MOBILITY  Goal: Mobility/activity is maintained at optimum level for patient  Outcome: Ongoing      Problem: COMMUNICATION IMPAIRMENT  Goal: Ability to express needs and understand communication  Outcome: Ongoing

## 2018-12-13 NOTE — CONSULTS
RENAL DOSE ADJUSTMENT MADE PER P/T PROTOCOL    PREVIOUS ORDER:  Zosyn 3.375gm ivpb q12h    Estimated Creatinine Clearance: 16 mL/min (A) (based on SCr of 3 mg/dL (H)). Recent Labs      12/11/18   1239  12/12/18   0946  12/13/18   0912   BUN  36*  39*   --    CREATININE  2.6*  3.0*   --    PLT  265   --   201     NEW RENALLY ADJUSTED ORDER:  ZOSYN 2.25GM IVPB Q12H    Derick Hernandez.  SHARMILA Mccarty [facility]  12/13/2018 10:01 AM  __________________________________________________

## 2018-12-13 NOTE — PLAN OF CARE
Problem: Risk for Impaired Skin Integrity  Goal: Tissue integrity - skin and mucous membranes  Structural intactness and normal physiological function of skin and  mucous membranes.    Outcome: Met This Shift      Problem: Infection:  Goal: Will remain free from infection  Will remain free from infection   Outcome: Ongoing      Problem: Safety:  Goal: Free from accidental physical injury  Free from accidental physical injury   Outcome: Met This Shift    Goal: Free from intentional harm  Free from intentional harm   Outcome: Met This Shift      Problem: Daily Care:  Goal: Daily care needs are met  Daily care needs are met   Outcome: Ongoing      Problem: Pain:  Goal: Patient's pain/discomfort is manageable  Patient's pain/discomfort is manageable   Outcome: Ongoing

## 2018-12-13 NOTE — DISCHARGE INSTR - COC
Continuity of Care Form    Patient Name: Delia Arzate   :  10/28/1927  MRN:  [id number]    Admit date:  2018  Discharge date:  18    Code Status Order: DNR-CCA   Advance Directives:   [facility] Documentation     Date/Time Healthcare Directive Type of Healthcare Directive Copy in [address] Agent's Name Healthcare Agent's Phone Number    [phone]  Yes, patient has an advance directive for healthcare treatment  Living will;Durable power of  for health care  No, copy requested from family  Adult Children  --  --          Admitting Physician:  No admitting provider for patient encounter.   PCP: John Gonzáles MD    Discharging Nurse: [address] Unit/Room#: 3006/3006-A  Discharging Unit Phone Number: [phone]    Emergency Contact:   Extended Emergency Contact Information  Primary Emergency Contact: [address] Phone: [phone]  Relation: Child  Secondary Emergency Contact: Janelle Woodard [address] Phone: [phone]  Mobile Phone: [phone]  Relation: Child    Past Surgical History:  Past Surgical History:   Procedure Laterality Date    ABDOMEN SURGERY      BLADDER SUSPENSION      CARDIAC CATHETERIZATION  5/28/15    No evidence of hemodynamically significant CAD    COLOSTOMY Right     DILATATION, ESOPHAGUS      JOINT REPLACEMENT      PROSTATECTOMY      REVISION TOTAL HIP ARTHROPLASTY Right 2017       Immunization History:   Immunization History   Administered Date(s) Administered    Influenza Vaccine, unspecified formulation 2016, 2017    Influenza Virus Vaccine 2015    Pneumococcal 13-valent Conjugate ([id number]) 10/04/2013    Tdap (Boostrix, Adacel) 12/10/2011       Active Problems:  Patient Active Problem List   Diagnosis Code    Acute-on-chronic kidney injury ([facility] [address] N17.9, N18.9   

## 2018-12-13 NOTE — PROGRESS NOTES
106   CO2  29  29  27   BUN  36*  39*  35*   CREATININE  2.6*  3.0*  2.3*     Recent Labs      12/11/18   1239   AST  18   ALT  16   BILITOT  0.4   ALKPHOS  116     No results for input(s): INR in the last 72 hours.   Recent Labs      12/11/18   1239   TROPONINT  <0.010        Imaging reviewed      Electronically signed by Caterina Blakely MD on 12/13/2018 at 12:34 PM

## 2018-12-13 NOTE — PROGRESS NOTES
Physical Therapy    Facility/Department: [facility] 3E  Initial Assessment    NAME: Jory Hamman  : 10/28/1927  MRN: [id number]    Date of Service: 2018    Discharge Recommendations:  [address], Patient would benefit from continued therapy after discharge        Patient Diagnosis(es): The primary encounter diagnosis was Respiratory failure with hypercapnia, unspecified chronicity ([address] A diagnosis of Disorientation was also pertinent to this visit. has a past medical history of Acute renal failure (ARF) ([address] Arthritis; Cancer ([address] Cerebral artery occlusion with cerebral infarction ([address] Chronic combined systolic and diastolic congestive heart failure ([address] Chronic kidney disease; Chronic kidney disease (CKD); Cognitive communication deficit; Depression; Difficulty in walking; DVT (deep venous thrombosis) ([address] H/O anemia of chronic renal failure; H/O CHF; H/O percutaneous left heart catheterization; History of blood transfusion; History of Holter monitoring; History of nuclear stress test; Hx of blood clots; Hx of echocardiogram; HX OTHER MEDICAL; Hyperlipidemia; Hypertension; Hypothyroidism; Pneumonia; and Thyroid disease. has a past surgical history that includes Abdomen surgery; Prostatectomy; joint replacement; Dilatation, esophagus; colostomy (Right, ); bladder suspension; Cardiac catheterization (5/28/15); and Revision total hip arthroplasty (Right, 2017). Restrictions  Restrictions/Precautions  Restrictions/Precautions: General Precautions, Fall Risk  Position Activity Restriction  Other position/activity restrictions: Per RN, pt okay to be seen.  Bed/chair alarm, portable tele, 3L O2   Vision/Hearing  Vision: Impaired  Vision Exceptions: Wears glasses at all times  Hearing: Within functional limits     Subjective  General  Chart Reviewed: Yes  Patient assessed for rehabilitation services?: Yes  Family / Caregiver Present: No  Follows Commands: Within recliner and standard toilet )  Comment: Cynthia for steadying and anterior weight shift over JAZMINE. VCs for UE placement, body positioning with Rw, posture, and sequencing full pivot prior to sitting. Unsteady with bkwds stepping taking very small steps. Used grab bar for toilet transfer sit><stand   Ambulation  Ambulation?: Yes  Ambulation 1  Surface: level tile  Device: Rolling Walker  Assistance: Contact guard assistance;Minimal assistance  Quality of Gait: very small steps, minimal foot clearance, fwd neck/head looking down, outside JAZMINE of walker  Distance: 10ft x 2   Comments: inc instability with bkwds stepping > than with fwd. Needing constant CGA with occasional Cynthia for steadying and maintaining balance. No report of SOB. Unable to attain accurate SPO2 reading  Stairs/Curb  Stairs?: No     Balance  Posture: Fair  Sitting - Static: Good  Sitting - Dynamic: Fair  Standing - Static: Fair  Standing - Dynamic: Fair;Poor  Comments: multiple LOB performing self care tasks without UE support standing at sink         Assessment   Body structures, Functions, Activity limitations: Decreased functional mobility ; Decreased ADL status; Decreased strength;Decreased safe awareness;Decreased balance;Decreased endurance  Assessment: Pt is a 80year old male admitted with encephalopathy w/ recent fall. Recommend subacute rehab once medically stable. Prior to admission pt was at [address] [address]. Pt reports being able to manage short room distances with rollator Sal and toileting himself. Did have assist from staff for longer distance mobility and showering. Currently is needing Cynthia for functional transfers and short distance gait (~10ft) with RW. Limited with activity tolerance and LE/trunk fatigue. Would benefit from continue therapy prior to discharge back to [facility] to address current deficits, dec potential fall risk, and return to PLOF.    Treatment Diagnosis: encephalopathy   Prognosis: Good  Decision Making: Medium Complexity  Patient Education: POC, role of PT, use of DME  REQUIRES PT FOLLOW UP: Yes  Activity Tolerance  Activity Tolerance: Patient Tolerated treatment well;Patient limited by fatigue;Patient limited by endurance  Activity Tolerance: tolerated ~5 minutes of standing with progressive fatigue to trunk and LEs          Plan   Plan  Times per week: 3  Times per day: Daily  Plan weeks: 1  Current Treatment Recommendations: Strengthening, Balance Training, Functional Mobility Training, Transfer Training, ADL/Self-care Training, Gait Training, Endurance Training, Neuromuscular Re-education, Positioning, Equipment Evaluation, Education, & procurement, Patient/Caregiver Education & Training, Home Exercise Program, Safety Education & Training  Safety Devices  Type of devices: All fall risk precautions in place, Call light within reach, Chair alarm in place, Gait belt, Left in chair    G-Code  PT G-Codes  Functional Limitation: Mobility: Walking and moving around  Mobility: Walking and Moving Around Current Status (): At least 40 percent but less than 60 percent impaired, limited or restricted  Mobility: Walking and Moving Around Goal Status ():  At least 1 percent but less than 20 percent impaired, limited or restricted  OutComes Score                                           AM-PAC Score  AM-PAC Inpatient Mobility Raw Score : 19  AM-PAC Inpatient T-Scale Score : 45.44  Mobility Inpatient CMS 0-100% Score: 41.77  Mobility Inpatient CMS G-Code Modifier : CK          Goals  Short term goals  Time Frame for Short term goals: 7-10 days   Short term goal 1: Pt will perform sit><supine from flat surface   Short term goal 2: Pt will transfer sit><stand SBA   Short term goal 3: Pt will transfer to bed/chair with SBA and RW   Short term goal 4: Pt will ambulate 75ft with RW SBA   Long term goals  Time Frame for Long term goals : LTGs=STGs due to anticipated short length of stay        Therapy Time   Individual Concurrent

## 2018-12-14 VITALS
HEART RATE: 76 BPM | OXYGEN SATURATION: 100 % | DIASTOLIC BLOOD PRESSURE: 78 MMHG | WEIGHT: 162.7 LBS | TEMPERATURE: 98.1 F | SYSTOLIC BLOOD PRESSURE: 177 MMHG | RESPIRATION RATE: 19 BRPM | BODY MASS INDEX: 22.78 KG/M2 | HEIGHT: 71 IN

## 2018-12-14 PROCEDURE — 6370000000 HC RX 637 (ALT 250 FOR IP): Performed by: HOSPITALIST

## 2018-12-14 PROCEDURE — 6370000000 HC RX 637 (ALT 250 FOR IP): Performed by: INTERNAL MEDICINE

## 2018-12-14 PROCEDURE — 6360000002 HC RX W HCPCS: Performed by: INTERNAL MEDICINE

## 2018-12-14 PROCEDURE — 99211 OFF/OP EST MAY X REQ PHY/QHP: CPT

## 2018-12-14 PROCEDURE — 2700000000 HC OXYGEN THERAPY PER DAY

## 2018-12-14 PROCEDURE — 94640 AIRWAY INHALATION TREATMENT: CPT

## 2018-12-14 PROCEDURE — 2500000003 HC RX 250 WO HCPCS: Performed by: INTERNAL MEDICINE

## 2018-12-14 PROCEDURE — 94761 N-INVAS EAR/PLS OXIMETRY MLT: CPT

## 2018-12-14 RX ORDER — AMOXICILLIN 500 MG/1
500 CAPSULE ORAL 2 TIMES DAILY
Qty: 14 CAPSULE | Refills: 0 | Status: SHIPPED | OUTPATIENT
Start: 2018-12-14 | End: 2018-12-21

## 2018-12-14 RX ADMIN — ASPIRIN 81 MG 81 MG: 81 TABLET ORAL at 07:16

## 2018-12-14 RX ADMIN — TAZOBACTAM SODIUM AND PIPERACILLIN SODIUM 3.38 G: 375; 3 INJECTION, SOLUTION INTRAVENOUS at 11:05

## 2018-12-14 RX ADMIN — FLUDROCORTISONE ACETATE 0.1 MG: 0.1 TABLET ORAL at 07:16

## 2018-12-14 RX ADMIN — IPRATROPIUM BROMIDE AND ALBUTEROL SULFATE 1 AMPULE: .5; 3 SOLUTION RESPIRATORY (INHALATION) at 08:27

## 2018-12-14 RX ADMIN — FAMOTIDINE 20 MG: 20 TABLET ORAL at 07:16

## 2018-12-14 RX ADMIN — PENTOXIFYLLINE 400 MG: 400 TABLET, EXTENDED RELEASE ORAL at 07:16

## 2018-12-14 RX ADMIN — ALLOPURINOL 200 MG: 100 TABLET ORAL at 07:16

## 2018-12-14 RX ADMIN — LEVOTHYROXINE SODIUM 75 MCG: 75 TABLET ORAL at 05:35

## 2018-12-14 RX ADMIN — HEPARIN SODIUM 5000 UNITS: 5000 INJECTION, SOLUTION INTRAVENOUS; SUBCUTANEOUS at 07:16

## 2018-12-14 RX ADMIN — IPRATROPIUM BROMIDE AND ALBUTEROL SULFATE 1 AMPULE: .5; 3 SOLUTION RESPIRATORY (INHALATION) at 12:30

## 2018-12-14 NOTE — DISCHARGE SUMMARY
PO  Take by mouth See Admin Instructions 12/11/18 Per MAR patient receives 2 mg on the 1st,2nd,3rd,4th,5th and 6th day of each month and 3 mg through the rest of each day the rest of the month. Per nursing he takes at [facility]                 Subjective _ patient is resting in bed with no distress- denies any pain, no nausea or vomiting- oriented to self only - doesn't know the date, time and year - know who the president is     Objective Findings at Discharge:   BP (!) 136/55   Pulse 71   Temp 97.4 °F (36.3 °C) (Oral)   Resp 22   Ht 5' 11\" (1.803 m)   Wt 162 lb 11.2 oz (73.8 kg)   SpO2 100%   BMI 22.69 kg/m²            PHYSICAL EXAM   GEN Awake male, resting in bed in no apparent distress. Appears given age. HEENT Mucous membranes are moist.  RESP Clear to auscultation, no wheezes, rales or rhonchi. CARDIO/VAS - S1/S2 auscultated. Regular rate without appreciable murmurs, rubs, or gallops. Peripheral pulses equal bilaterally and palpable. No peripheral edema. GI Abdomen is soft without significant tenderness, masses, or guarding. Bowel sounds are normoactive  MSK No gross joint deformities. Spontaneous movement of all extremities  SKIN Normal coloration, warm, dry. NEURO Cranial nerves appear grossly intact, normal speech, no lateralizing weakness.  oriented to self only      BMP/CBC  Recent Labs      12/11/18   1239  12/12/18   0946  12/13/18   0912   NA  141  143  142   K  4.7  4.5  4.2   CL  103  106  106   CO2  29  29  27   BUN  36*  39*  35*   CREATININE  2.6*  3.0*  2.3*   WBC  8.6   --   6.4   HCT  45.9   --   36.3*   PLT  265   --   201         Discharge Time of 32 minutes    Electronically signed by Kavita Victoria MD on 12/14/2018 at 11:23 AM

## 2018-12-14 NOTE — CONSULTS
[facility] Continence Nurse  Consult Note       Rene Francisco  AGE: 80 y.o. GENDER: male  : 10/28/1927  TODAY'S DATE:  2018    Subjective:     Reason for [facility] Evaluation and Assessment: wound consult      Rene Francisco is a 80 y.o. male referred by:   [x] Physician  [] Nursing  [] Other:     Wound Identification:  Wound Type: no wounds or pressure injuries noted  Contributing Factors: decreased mobility        PAST MEDICAL HISTORY        Diagnosis Date    [facility]F) [facility]     Arthritis     Cancer ([facility]     Prostate    Cerebral artery occlusion with cerebral infarction ([facility]     Chronic combined systolic and diastolic congestive heart failure ([facility] 2016    Chronic kidney disease     Chronic kidney disease (CKD)     Stage III    Cognitive communication deficit     Depression     Difficulty in walking     DVT (deep venous thrombosis) ([facility]     patient is on coumadin    H/O anemia of chronic renal failure     H/O CHF     H/O percutaneous left heart catheterization 5/28/15    No evidence of hemodynamically significant CAD    History of blood transfusion     History of Holter monitoring     Rhythm was Sinus, ventricular in single and couplet forms. Superventricular ectopics in single,pair and run forms.  History of nuclear stress test 5/13/15    EF 41%. Dilated cardiomyopathy with reduction of LV function. Mod-Severe superimposed ischemia in Basa, Mid, Apical Inferior regions.     Hx of blood clots     Hx of echocardiogram 2017    EF35-45%,mild tricuspid regurg,mild to moderate PHTN    HX OTHER MEDICAL 10/09/2017    MUGA-EF59%    Hyperlipidemia     Hypertension     Hypothyroidism     Pneumonia     Thyroid disease        PAST SURGICAL HISTORY    Past Surgical History:   Procedure Laterality Date    ABDOMEN SURGERY      BLADDER SUSPENSION      CARDIAC CATHETERIZATION  5/28/15    No evidence of hemodynamically significant CAD    COLOSTOMY Right 1989    DILATATION, ESOPHAGUS      JOINT REPLACEMENT      PROSTATECTOMY      REVISION TOTAL HIP ARTHROPLASTY Right 02/28/2017       FAMILY HISTORY    Family History   Problem Relation Age of Onset    Cancer Father     Cancer Brother     High Blood Pressure Grandchild     High Blood Pressure Daughter        SOCIAL HISTORY    Social History   Substance Use Topics    Smoking status: Former Smoker     Quit date: 9/29/1978    Smokeless tobacco: Never Used      Comment: quit in 1978    Alcohol use No       ALLERGIES    No Known Allergies    MEDICATIONS    No current facility-administered medications on file prior to encounter. Current Outpatient Prescriptions on File Prior to Encounter   Medication Sig Dispense Refill    metoprolol tartrate (LOPRESSOR) 25 MG tablet Take 1 tablet by mouth 2 times daily for 3 days 6 tablet 0    latanoprost (XALATAN) 0.005 % ophthalmic solution Place 1 drop into the right eye nightly      rosuvastatin (CRESTOR) 5 MG tablet Take 5 mg by mouth nightly       fludrocortisone (FLORINEF) 0.1 MG tablet Take 0.1 mg by mouth daily      ondansetron (ZOFRAN) 4 MG tablet Take 4 mg by mouth every 6 hours as needed for Nausea or Vomiting      aspirin 81 MG chewable tablet Take 1 tablet by mouth daily 30 tablet 0    fluconazole (DIFLUCAN) 200 MG tablet Take 200 mg by mouth daily Once daily for the first 5 days of each month.       allopurinol (ZYLOPRIM) 300 MG tablet Take 300 mg by mouth daily      tobramycin (TOBREX) 0.3 % ophthalmic solution PLACE 1 DROP IN LEFT EYE EVERY 3 HOURS AS NEEDED  0    ranitidine (ZANTAC) 150 MG capsule Take 150 mg by mouth daily as needed       imipramine (TOFRANIL) 50 MG tablet Take 50 mg by mouth nightly      acetaminophen (TYLENOL) 500 MG tablet Take 500 mg by mouth every 6 hours as needed for Pain       levothyroxine (SYNTHROID) 75 MCG tablet   Take 75 mcg by mouth daily   5    timolol (TIMOPTIC) 0.5 % ophthalmic solution Place 1 drop into both

## 2018-12-14 NOTE — PROGRESS NOTES
Attempted to call report, nurse unable to take report at this time. Gave my number to have nurse call me back. Notified dtr Rafy Nunez of transport time of patient. Faxed AVS to [facility].

## 2018-12-16 LAB
CULTURE: NORMAL
Lab: NORMAL
REPORT STATUS: NORMAL
SPECIMEN: NORMAL

## 2018-12-17 LAB
EKG ATRIAL RATE: 69 BPM
EKG DIAGNOSIS: NORMAL
EKG P AXIS: 56 DEGREES
EKG P-R INTERVAL: 224 MS
EKG Q-T INTERVAL: 438 MS
EKG QRS DURATION: 100 MS
EKG QTC CALCULATION (BAZETT): 469 MS
EKG R AXIS: 24 DEGREES
EKG T AXIS: 59 DEGREES
EKG VENTRICULAR RATE: 69 BPM

## 2019-04-01 PROBLEM — F03.90 DEMENTIA (HCC): Status: ACTIVE | Noted: 2019-04-01

## 2019-04-25 ENCOUNTER — APPOINTMENT (OUTPATIENT)
Dept: GENERAL RADIOLOGY | Age: 84
End: 2019-04-25
Payer: MEDICARE

## 2019-04-25 ENCOUNTER — HOSPITAL ENCOUNTER (EMERGENCY)
Age: 84
Discharge: HOME | End: 2019-04-25
Attending: EMERGENCY MEDICINE
Payer: MEDICARE

## 2019-04-25 VITALS
WEIGHT: 162.7 LBS | TEMPERATURE: 98.1 F | DIASTOLIC BLOOD PRESSURE: 70 MMHG | BODY MASS INDEX: 22.78 KG/M2 | RESPIRATION RATE: 14 BRPM | OXYGEN SATURATION: 91 % | SYSTOLIC BLOOD PRESSURE: 181 MMHG | HEART RATE: 71 BPM | HEIGHT: 71 IN

## 2019-04-25 DIAGNOSIS — M79.644 FINGER PAIN, RIGHT: ICD-10-CM

## 2019-04-25 DIAGNOSIS — S61.001A OPEN DISLOCATION OF INTERPHALANGEAL JOINT OF RIGHT THUMB, INITIAL ENCOUNTER: Primary | ICD-10-CM

## 2019-04-25 DIAGNOSIS — S63.124A OPEN DISLOCATION OF INTERPHALANGEAL JOINT OF RIGHT THUMB, INITIAL ENCOUNTER: Primary | ICD-10-CM

## 2019-04-25 DIAGNOSIS — S61.011A LACERATION OF RIGHT THUMB WITHOUT FOREIGN BODY WITHOUT DAMAGE TO NAIL, INITIAL ENCOUNTER: ICD-10-CM

## 2019-04-25 PROCEDURE — 6370000000 HC RX 637 (ALT 250 FOR IP): Performed by: EMERGENCY MEDICINE

## 2019-04-25 PROCEDURE — 2500000003 HC RX 250 WO HCPCS: Performed by: EMERGENCY MEDICINE

## 2019-04-25 PROCEDURE — 99283 EMERGENCY DEPT VISIT LOW MDM: CPT

## 2019-04-25 PROCEDURE — 73140 X-RAY EXAM OF FINGER(S): CPT

## 2019-04-25 PROCEDURE — 4500000029 HC MAJOR PROCEDURE

## 2019-04-25 RX ORDER — LIDOCAINE HYDROCHLORIDE 20 MG/ML
10 INJECTION, SOLUTION INFILTRATION; PERINEURAL ONCE
Status: COMPLETED | OUTPATIENT
Start: 2019-04-25 | End: 2019-04-25

## 2019-04-25 RX ORDER — CEPHALEXIN 250 MG/1
500 CAPSULE ORAL ONCE
Status: COMPLETED | OUTPATIENT
Start: 2019-04-25 | End: 2019-04-25

## 2019-04-25 RX ORDER — CEPHALEXIN 500 MG/1
500 CAPSULE ORAL 4 TIMES DAILY
Qty: 12 CAPSULE | Refills: 0 | Status: SHIPPED | OUTPATIENT
Start: 2019-04-25 | End: 2019-04-28

## 2019-04-25 RX ADMIN — CEPHALEXIN 500 MG: 250 CAPSULE ORAL at 22:13

## 2019-04-25 RX ADMIN — LIDOCAINE HYDROCHLORIDE 10 ML: 20 INJECTION, SOLUTION INFILTRATION; PERINEURAL at 21:28

## 2019-04-25 ASSESSMENT — PAIN DESCRIPTION - LOCATION: LOCATION: HAND

## 2019-04-25 ASSESSMENT — PAIN SCALES - GENERAL
PAINLEVEL_OUTOF10: 7
PAINLEVEL_OUTOF10: 7

## 2019-04-25 ASSESSMENT — PAIN DESCRIPTION - PAIN TYPE: TYPE: ACUTE PAIN

## 2019-04-25 ASSESSMENT — PAIN DESCRIPTION - FREQUENCY: FREQUENCY: CONTINUOUS

## 2019-04-25 ASSESSMENT — PAIN DESCRIPTION - DESCRIPTORS: DESCRIPTORS: ACHING;SHARP

## 2019-04-25 ASSESSMENT — PAIN DESCRIPTION - ONSET: ONSET: SUDDEN

## 2019-04-25 ASSESSMENT — PAIN DESCRIPTION - ORIENTATION: ORIENTATION: RIGHT

## 2019-04-26 ENCOUNTER — HOSPITAL ENCOUNTER (EMERGENCY)
Age: 84
Discharge: HOME | End: 2019-04-26
Payer: MEDICARE

## 2019-04-26 VITALS
BODY MASS INDEX: 23.8 KG/M2 | TEMPERATURE: 98.2 F | WEIGHT: 170 LBS | DIASTOLIC BLOOD PRESSURE: 62 MMHG | OXYGEN SATURATION: 96 % | HEIGHT: 71 IN | RESPIRATION RATE: 16 BRPM | HEART RATE: 66 BPM | SYSTOLIC BLOOD PRESSURE: 148 MMHG

## 2019-04-26 DIAGNOSIS — S61.019D: Primary | ICD-10-CM

## 2019-04-26 DIAGNOSIS — S62.501D: ICD-10-CM

## 2019-04-26 PROCEDURE — 4500000027

## 2019-04-26 PROCEDURE — 2500000003 HC RX 250 WO HCPCS: Performed by: PHYSICIAN ASSISTANT

## 2019-04-26 PROCEDURE — 99282 EMERGENCY DEPT VISIT SF MDM: CPT

## 2019-04-26 RX ORDER — LIDOCAINE HYDROCHLORIDE 20 MG/ML
10 INJECTION, SOLUTION INFILTRATION; PERINEURAL ONCE
Status: COMPLETED | OUTPATIENT
Start: 2019-04-26 | End: 2019-04-26

## 2019-04-26 RX ADMIN — LIDOCAINE HYDROCHLORIDE 10 ML: 20 INJECTION, SOLUTION INFILTRATION; PERINEURAL at 13:37

## 2019-04-26 ASSESSMENT — PAIN SCALES - GENERAL: PAINLEVEL_OUTOF10: 0

## 2019-04-26 NOTE — ED NOTES
[facility] called for patient transport back to [facility], ETA [phone].      Katrina Pope  04/25/19 [id number]

## 2019-04-26 NOTE — ED NOTES
Bed: H-05  Expected date: 4/25/19  Expected time: 8:45 PM  Means of arrival: [facility]  Comments:  ems     Jacque Hopper RN  04/25/19 2045

## 2019-04-26 NOTE — ED TRIAGE NOTES
Pt is from [facility] and reports of tripping while using walker and reports of right hand thumb going back. Denies hitting head.

## 2019-04-26 NOTE — ED PROVIDER NOTES
Triage Chief Complaint:   Hand Injury and Fall    [place]:  Dusty Villarreal is a 80 y.o. male that presents with complaint of hand injury and fall. Was walking with walker, stumbled and landed on right hand. Having pain and bleeding from the right thumb. Denies other injury and other pain. Did not strike his head. No back pain or neck pain. No LOC. No abdominal pain. No chest pain or difficulty breathing. No extremity pain other than right thumb. Denies other complaint. Is on coumadin     ROS:  10 systems reviewed and negative except as above. Past Medical History:   Diagnosis Date    Acute renal failure (ARF) ([address]     Arthritis     Cancer ([facility] 1990    Prostate    Cerebral artery occlusion with cerebral infarction ([facility]     Chronic combined systolic and diastolic congestive heart failure ([facility] 7/19/2016    Chronic kidney disease     Chronic kidney disease (CKD)     Stage III    Cognitive communication deficit     Depression     Difficulty in walking     DVT (deep venous thrombosis) ([facility])     patient is on coumadin    H/O anemia of chronic renal failure     H/O CHF     H/O percutaneous left heart catheterization 5/28/15    No evidence of hemodynamically significant CAD    History of blood transfusion     History of Holter monitoring     Rhythm was Sinus, ventricular in single and couplet forms. Superventricular ectopics in single,pair and run forms.  History of nuclear stress test 5/13/15    EF 41%. Dilated cardiomyopathy with reduction of LV function. Mod-Severe superimposed ischemia in Basa, Mid, Apical Inferior regions.     Hx of blood clots     Hx of echocardiogram 08/28/2017    EF35-45%,mild tricuspid regurg,mild to moderate PHTN    HX OTHER MEDICAL 10/09/2017    MUGA-EF59%    Hyperlipidemia     Hypertension     Hypothyroidism     Pneumonia     Thyroid disease      Past Surgical History:   Procedure Laterality Date    ABDOMEN SURGERY      BLADDER SUSPENSION      CARDIAC CATHETERIZATION  5/28/15    No evidence of hemodynamically significant CAD    COLOSTOMY Right     DILATATION, ESOPHAGUS      JOINT REPLACEMENT      PROSTATECTOMY      REVISION TOTAL HIP ARTHROPLASTY Right 2017     Family History   Problem Relation Age of Onset    Cancer Father     Cancer Brother     High Blood Pressure Grandchild     High Blood Pressure Daughter      Social History     Socioeconomic History    Marital status:      Spouse name: Not on file    Number of children: Not on file    Years of education: Not on file    Highest education level: Not on file   Occupational History    Not on file   Social Needs    Financial resource strain: Not on file    Food insecurity:     Worry: Not on file     Inability: Not on file    Transportation needs:     Medical: Not on file     Non-medical: Not on file   Tobacco Use    Smoking status: Former Smoker     Last attempt to quit: 1978     Years since quittin.6    Smokeless tobacco: Never Used    Tobacco comment: quit in    Substance and Sexual Activity    Alcohol use: No     Alcohol/week: 0.0 oz    Drug use: No    Sexual activity: Not on file   Lifestyle    Physical activity:     Days per week: Not on file     Minutes per session: Not on file    Stress: Not on file   Relationships    Social connections:     Talks on phone: Not on file     Gets together: Not on file     Attends Buddhist service: Not on file     Active member of club or organization: Not on file     Attends meetings of clubs or organizations: Not on file     Relationship status: Not on file    Intimate partner violence:     Fear of current or ex partner: Not on file     Emotionally abused: Not on file     Physically abused: Not on file     Forced sexual activity: Not on file   Other Topics Concern    Not on file   Social History Narrative    Not on file     No current facility-administered medications for this encounter.       Current Outpatient needed        No Known Allergies    Nursing Notes Reviewed    Physical Exam:  ED Triage Vitals [04/25/19 2046]   Enc Vitals Group      BP (!) 181/70      Pulse 71      Resp 14      Temp 98.1 °F (36.7 °C)      Temp Source Oral      SpO2 91 %      Weight 162 lb 11.2 oz (73.8 kg)      Height 5' 11\" (1.803 m)      Head Circumference       Peak Flow       Pain Score       Pain Loc       Pain Edu? Excl. in [address]? My pulse ox interpretation is - normal    General appearance:  No acute distress. Skin:  Warm. Dry. Eye:  Extraocular movements intact. Ears, nose, mouth and throat:  Oral mucosa moist   Neck:  Trachea midline. Extremity:  No swelling. Normal ROM of bilateral LE with no tenderness to palpation over bilateral hips, thighs, knees, shins/calves, ankles and feet. nontender over bilateral shoulders, upper arms, elbows, forearms, wrist and hands other than the right thumb where has obvious dislocation of the right distal phalanx with joint open, laceration 2cm across the IP joint and joint exposed. I am able to visualize tendon and does not appear to be lacerated. Heart:  Regular rate and rhythm, normal S1 & S2, no extra heart sounds. Perfusion:  intact  Respiratory:  Lungs clear to auscultation bilaterally. Respirations nonlabored. Abdominal:    Soft. Nontender. Non distended. Neurological:  Alert and oriented times 3. Sensation intact in distal right thumb. Psychiatric:  Appropriate    I have reviewed and interpreted all of the currently available lab results from this visit (if applicable):  No results found for this visit on 04/25/19. Radiographs (if obtained):  [] The following radiograph was interpreted by myself in the absence of a radiologist:   [] Radiologist's Report Reviewed:  XR FINGER RIGHT (MIN 2 VIEWS)   Final Result   Anatomic alignment of the thumb DIP joint and distal phalanx after reduction.    Suspect a small undisplaced fracture at the base of the distal phalanx. Please see annotated images. XR FINGER RIGHT (MIN 2 VIEWS)   Final Result   1. Open dislocation of the 1st digit at the interphalangeal joint. No   definite fracture identified. 2. 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   3. Severe osteopenia. EKG (if obtained): (All EKG's are interpreted by myself in the absence of a cardiologist)    Chart review shows recent radiographs:  No results found. MDM:  89yom with history as above, presents with complaint of right thumb injury after fall. Sounds like a mechanical fall, has obvious open dislocation of the right 1rst IP joint. I did attempt reduction at bedside initially but unable to tolerate, while getting lidocaine we did get an XRay as above. I was able to reduce it easily after digital block of the thumb. He has full ROM of the joint with flexion and extension and repeat XRay shows it in good position with likely small fracture. Irrigated out and started antibiotics. I did discuss with [facility] [facility]- Dr. Burke Villatoro who asked that I start keflex, ok to close and have them call tomorrow for follow up in clinic. He tolerated the closure well, still with full ROM, aluminum foam splint placed. Will be discharged in stable condition. Patient and family all agreeable to plan. Joint Reduction Procedure Note    Indication: Joint dislocation    Consent: The patient was counseled regarding the procedure, it's indications, risks, potential complications and alternatives and any questions were answered. Consent was obtained. Procedure: The pre-reduction exam showed distal perfusion & neurologic function to be normal. The patient was placed in the appropriate position. Anesthesia/pain control was obtained using a digital block of the right thumb using 2% Lidocaine without epinephrine. Reduction of the right thumb IP joint was performed by direct traction.  Post reduction films were obtained and revealed satisfactory reduction. A post-reduction exam revealed distal perfusion & neurologic function to be normal. The affected area was immobilized with an aluminum/ foam splint. The patient tolerated the procedure well. Complications: None        Laceration Repair Procedure Note    Indication: Laceration    Procedure: The patient was placed in the appropriate position and anesthesia around the laceration was obtained by infiltration using 2% Lidocaine without epinephrine for a digital block as above. The area was then cleansed with betadine and draped in a sterile fashion and irrigated with normal saline. The laceration was closed with 3-0 Prolene using interrupted sutures. There were no additional lacerations requiring repair. The wound area was then dressed with a sterile dressing and an alumnifoam splint. Total repaired wound length: 2 cm. Other Items: Suture count: 5    The patient tolerated the procedure well. Complications: None        Clinical Impression:  1. Open dislocation of interphalangeal joint of right thumb, initial encounter    2. Finger pain, right    3.  Laceration of right thumb without foreign body without damage to nail, initial encounter      Disposition referral (if applicable):  [address] [place] [address] and [facility]  [address] [place] [address] Emergency Department  [place] [id number]  [phone]    If symptoms worsen    Disposition medications (if applicable):  Discharge Medication List as of 4/25/2019 10:56 PM      START taking these medications    Details   cephALEXin (KEFLEX) 500 MG capsule Take 1 capsule by mouth 4 times daily for 3 days, Disp-12 capsule, R-0Print             Comment: Please note this report has been produced using speech recognition software and may contain errors related to that system including errors in grammar, punctuation, and spelling, as well as words and phrases that may be inappropriate. If there are any questions or concerns please feel free to contact the dictating provider for clarification.         Zuleika Hogue MD  04/26/19 [id number]       Zuleika Hogue MD  04/30/19 2802

## 2019-04-27 NOTE — ED PROVIDER NOTES
EMERGENCY DEPARTMENT ENCOUNTER        PCP: Manuela Lewis MD    CHIEF COMPLAINT    Chief Complaint   Patient presents with    Laceration     seen last night for lac and dislocation right thumb, per family some sutures are not holding wound together and nurse from [address] requested family bring pt back to ed today       HPI    Linda Stokes is a 80 y.o. male who presents back to the emergency department today sent from long-term care facility from bleeding from a recent thumb wound. Patient was seen yesterday had a acute traumatic dislocation/fracture of the thumb. It was considered \"there is a laceration. Patient was numbed up and sutured, the dislocation was reduced. Was placed on antibiotics and advised to follow-up with a hand doctor out of [facility].  Returning back as the wound continues to bleed. They brought back to see if more sutures could be placed. He has no other injuries. REVIEW OF SYSTEMS    General:   Denies symptoms preceding injury. Skin: SEE HPI  Musculoskeletal:  No distal numbness, tingling. No obvious tendon or motor deficits. Denies any other musculoskeletal injuries or skin trauma.     All other review of systems are negative  See HPI and nursing notes for additional information     PAST MEDICAL & SURGICAL HISTORY    Past Medical History:   Diagnosis Date    Acute renal failure (ARF) ([address]     Arthritis     Cancer ([address] 1990    Prostate    Cerebral artery occlusion with cerebral infarction ([address]     Chronic combined systolic and diastolic congestive heart failure ([address] 7/19/2016    Chronic kidney disease     Chronic kidney disease (CKD)     Stage III    Cognitive communication deficit     Depression     Difficulty in walking     DVT (deep venous thrombosis) ([address]     patient is on coumadin    H/O anemia of chronic renal failure     H/O CHF     H/O percutaneous left heart catheterization 5/28/15    No evidence of hemodynamically significant CAD  History of blood transfusion     History of Holter monitoring     Rhythm was Sinus, ventricular in single and couplet forms. Superventricular ectopics in single,pair and run forms.  History of nuclear stress test 5/13/15    EF 41%. Dilated cardiomyopathy with reduction of LV function. Mod-Severe superimposed ischemia in Basa, Mid, Apical Inferior regions.  Hx of blood clots     Hx of echocardiogram 08/28/2017    EF35-45%,mild tricuspid regurg,mild to moderate PHTN    HX OTHER MEDICAL 10/09/2017    [id number]%    Hyperlipidemia     Hypertension     Hypothyroidism     Pneumonia     Thyroid disease      Past Surgical History:   Procedure Laterality Date    ABDOMEN SURGERY      BLADDER SUSPENSION      CARDIAC CATHETERIZATION  5/28/15    No evidence of hemodynamically significant CAD    COLOSTOMY Right 1989    DILATATION, ESOPHAGUS      JOINT REPLACEMENT      PROSTATECTOMY      REVISION TOTAL HIP ARTHROPLASTY Right 02/28/2017       CURRENT MEDICATIONS    Current Outpatient Rx   Medication Sig Dispense Refill    cephALEXin (KEFLEX) 500 MG capsule Take 1 capsule by mouth 4 times daily for 3 days 12 capsule 0    cloNIDine (CATAPRES) 0.3 MG tablet Take 0.3 mg by mouth daily Take only if systolic [id number]      lisinopril (PRINIVIL;ZESTRIL) 40 MG tablet Take 40 mg by mouth 2 times daily      DULoxetine (CYMBALTA) 20 MG extended release capsule Take 20 mg by mouth daily      metoprolol tartrate (LOPRESSOR) 25 MG tablet Take 1 tablet by mouth 2 times daily for 3 days 6 tablet 0    WARFARIN SODIUM PO Take by mouth See Admin Instructions 12/11/18 Per MAR patient receives 2 mg on the 1st,2nd,3rd,4th,5th and 6th day of each month and 3 mg through the rest of each day the rest of the month.  Per nursing he takes at [facility]      latanoprost (XALATAN) 0.005 % ophthalmic solution Place 1 drop into the right eye nightly      rosuvastatin (CRESTOR) 5 MG tablet Take 5 mg by mouth nightly       fludrocortisone session: None    Stress: None   Relationships    Social connections:     Talks on phone: None     Gets together: None     Attends Congregational service: None     Active member of club or organization: None     Attends meetings of clubs or organizations: None     Relationship status: None    Intimate partner violence:     Fear of current or ex partner: None     Emotionally abused: None     Physically abused: None     Forced sexual activity: None   Other Topics Concern    None   Social History Narrative    None     Family History   Problem Relation Age of Onset    Cancer Father     Cancer Brother     High Blood Pressure Grandchild     High Blood Pressure Daughter            PHYSICAL EXAM    VITAL SIGNS: BP (!) 148/62   Pulse 66   Temp 98.2 °F (36.8 °C) (Oral)   Resp 16   Ht 5' 11\" (1.803 m)   Wt 170 lb (77.1 kg)   SpO2 96%   BMI 23.71 kg/m²   Constitutional:  Well developed, Appears comfortable  HEENT:  Normocephalic, Atraumatic. PERRL, EOMI. Musculoskeletal:  No gross deformities. No motor deficits. Distal sensation and capillary refill intact. Vascular: Distal pulses and capillary refill intact. Integument:    On inspection there is bleeding from the wound, there is evidence of 5 blue Prolene sutures placed loosely. No obvious foreign body on initial inspection. No obvious tendon involvement    Distal sensation, capillary refill intact. Distal joints with full range of motion    See below for further details. Neurologic:  Awake and alert, normal flow of speech. CN 2-12 intact.     Psychiatric: Cooperative, pleasant affect        RADIOLOGY/PROCEDURES    Xr Finger Right (min 2 Views)    Result Date: 4/25/2019  EXAMINATION: 3 XRAY VIEWS OF THE RIGHT FINGERS 4/25/2019 10:09 pm COMPARISON: Hand radiographs 04/25/2019 HISTORY: ORDERING SYSTEM PROVIDED HISTORY: Finger pain, right TECHNOLOGIST PROVIDED HISTORY: Reason for exam:->post reduction Ordering Physician Provided Reason for Exam: post reduction Acuity: Acute Type of Exam: Subsequent/Follow-up Mechanism of Injury: fall Relevant Medical/Surgical History: none FINDINGS: Interval reduction of the thumb distal phalanx, now in anatomic alignment with the proximal phalanx. Some osteoarthritic background changes are evident although there seems to be a nondisplaced fracture base of the distal phalanx. Osteoarthrosis appreciated about the 1st [facility] joint. Remainder of the exam is unchanged. Anatomic alignment of the thumb DIP joint and distal phalanx after reduction. Suspect a small undisplaced fracture at the base of the distal phalanx. Please see annotated images. Xr Finger Right (min 2 Views)    Result Date: 4/25/2019  EXAMINATION: 3 XRAY VIEWS OF THE RIGHT FINGERS 4/25/2019 9:24 pm COMPARISON: Right hand radiograph December 10, 2011 HISTORY: ORDERING SYSTEM PROVIDED HISTORY: right thumb IP joint dislocation, open TECHNOLOGIST PROVIDED HISTORY: Reason for exam:->right thumb IP joint dislocation, open Ordering Physician Provided Reason for Exam: right thumb IP joint dislocation, open Acuity: Acute Type of Exam: Initial Mechanism of Injury: fall Relevant Medical/Surgical History: none FINDINGS: Four views of the right 5th digit demonstrate an acute dislocation of the distal phalanx of the 5th digit with the distal aspect of the proximal 5th phalanx extending through the skin surface compatible with open dislocation. No definite fracture identified. The bones are severely osteopenic. Moderate to severe osteoarthritic changes throughout the hand and wrist. Atherosclerotic vascular calcifications are present. 1. Open dislocation of the 1st digit at the interphalangeal joint. No definite fracture identified.  2. 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 3. Severe osteopenia.       ________________________________________________________________________       Procedure Note - FIOR PEGUERO PA-C      Laceration Repair Procedure Note    Indication: Revision of suture repair to the right thumb laceration    Procedure:   - Procedure explained, including risks and benefits explained to the patient who expressed understanding. All questions were answered. Verbal consent obtained. - The Wound was prepped and draped in the usual sterile fashion using Betadine and sterile saline.  - The wound is anesthetized using 2% lidocaine, approximately 6ml, utilizing a digital block  - Wound was explored to it's depth: Old sutures were removed. no foreign bodies. no compromise of neurovascular or tendon structures  - Wound was irrigated with copious amounts of sterile saline and mechanically debrided utilizing sterile gauze. - The laceration was Closed with 4-0 prolene sutures, several simple interrupted  - Hemostasis and good cosmesis was achieved. Blood loss minimal.  - The wound area was then dressed with Sterile nonstick dressing, sterile gauze, and tape. - Patient tolerated procedure well without complications. Post procedure exam of the affected region reveals distal sensation, motor, capillary refill, and pulses intact    Total repaired wound length: 4 cm  ________________________________________________________________________    ED COURSE & MEDICAL DECISION MAKING        I discussed possibility of infection, retained foreign body, tendon injury, nerve injury. Wound care instructions discussed with patient today. The wound was then reinforced with a better pressure dressing. We will still encourage follow-up with orthopedic hand. They were given strict return precautions. Wound check in 2-3 days    Diagnosis and plan discussed in detail with patient who understands and agrees. Return to emergency Department precautions were discussed in detail with patient who understands and agrees. Vital signs and nursing notes reviewed during ED course. I have independently evaluated this patient.       *All pertinent Lab data and radiographic results reviewed with patient at bedside. The patient and/or the family were informed of the results of any tests/labs/imaging, the treatment plan, and time was allotted to answer questions. Clinical  IMPRESSION    1. Laceration of thumb, subsequent encounter    2. Open fracture dislocation of interphalangeal joint of right thumb, with routine healing, subsequent encounter              Comment: Please note this report has been produced using speech recognition software and may contain errors related to that system including errors in grammar, punctuation, and spelling, as well as words and phrases that may be inappropriate. If there are any questions or concerns please feel free to contact the dictating provider for clarification.         Mian Bland 411, PA  04/27/19 [id number]

## 2019-05-29 ENCOUNTER — TELEPHONE (OUTPATIENT)
Dept: CARDIOLOGY CLINIC | Age: 84
End: 2019-05-29

## 2019-07-02 PROBLEM — N18.4 CHRONIC KIDNEY DISEASE, STAGE IV (SEVERE) (HCC): Status: ACTIVE | Noted: 2019-07-02

## 2019-07-02 PROBLEM — I10 ESSENTIAL HYPERTENSION: Status: ACTIVE | Noted: 2019-07-02

## 2019-08-06 ENCOUNTER — OFFICE VISIT (OUTPATIENT)
Dept: CARDIOLOGY CLINIC | Age: 84
End: 2019-08-06
Payer: MEDICARE

## 2019-08-06 VITALS
DIASTOLIC BLOOD PRESSURE: 74 MMHG | HEART RATE: 60 BPM | BODY MASS INDEX: 23.04 KG/M2 | WEIGHT: 164.6 LBS | SYSTOLIC BLOOD PRESSURE: 116 MMHG | HEIGHT: 71 IN

## 2019-08-06 DIAGNOSIS — I48.0 PAF (PAROXYSMAL ATRIAL FIBRILLATION) (HCC): Primary | ICD-10-CM

## 2019-08-06 PROCEDURE — 1123F ACP DISCUSS/DSCN MKR DOCD: CPT | Performed by: INTERNAL MEDICINE

## 2019-08-06 PROCEDURE — 99214 OFFICE O/P EST MOD 30 MIN: CPT | Performed by: INTERNAL MEDICINE

## 2019-08-06 PROCEDURE — G8428 CUR MEDS NOT DOCUMENT: HCPCS | Performed by: INTERNAL MEDICINE

## 2019-08-06 PROCEDURE — G8420 CALC BMI NORM PARAMETERS: HCPCS | Performed by: INTERNAL MEDICINE

## 2019-08-06 PROCEDURE — 1036F TOBACCO NON-USER: CPT | Performed by: INTERNAL MEDICINE

## 2019-08-06 PROCEDURE — 4040F PNEUMOC VAC/ADMIN/RCVD: CPT | Performed by: INTERNAL MEDICINE

## 2019-08-06 RX ORDER — KETOCONAZOLE 20 MG/ML
SHAMPOO TOPICAL DAILY
COMMUNITY

## 2019-08-06 NOTE — PROGRESS NOTES
Rylan Ovalle MD        OFFICE  FOLLOWUP NOTE    Chief complaints: patient is here for management of hypothyroidism, HTN, AFlutter s/p ablation, DYSLPIDEMIA, cardiomyopathy  Subjective: patient feels better, no chest pain, no shortness of breath, no dizziness, no palpitations    HPI Damien Mars is a 80 y. o.year old who  has a past medical history of Acute renal failure (ARF) ([address] Arthritis, Cancer ([address] Cerebral artery occlusion with cerebral infarction ([address] Chronic combined systolic and diastolic congestive heart failure ([address] Chronic kidney disease, Chronic kidney disease (CKD), Cognitive communication deficit, Depression, Difficulty in walking, DVT (deep venous thrombosis) ([address] H/O anemia of chronic renal failure, H/O CHF, H/O percutaneous left heart catheterization, History of blood transfusion, History of Holter monitoring, History of nuclear stress test, Hx of blood clots, Hx of echocardiogram, HX OTHER MEDICAL, Hyperlipidemia, Hypertension, Hypothyroidism, Pneumonia, and Thyroid disease. and presents for management of hypothyroidism, HTN, AFlutter s/p ablation, DYSLPIDEMIA, cardiomyopathy which are well controlled      Current Outpatient Medications   Medication Sig Dispense Refill    ketoconazole (NIZORAL) 2 % shampoo Apply topically daily Apply topically daily as needed.       cloNIDine (CATAPRES) 0.3 MG tablet Take 0.3 mg by mouth daily Take only if systolic [id number]      lisinopril (PRINIVIL;ZESTRIL) 40 MG tablet Take 40 mg by mouth 2 times daily      DULoxetine (CYMBALTA) 20 MG extended release capsule Take 20 mg by mouth daily      metoprolol tartrate (LOPRESSOR) 25 MG tablet Take 1 tablet by mouth 2 times daily for 3 days (Patient taking differently: Take 50 mg by mouth 2 times daily ) 6 tablet 0    WARFARIN SODIUM PO Take by mouth See Admin Instructions 12/11/18 Per MAR patient receives 2 mg on the 1st,2nd,3rd,4th,5th and 6th day of each month and 3 mg through the rest of

## 2019-09-09 ENCOUNTER — HOSPITAL ENCOUNTER (INPATIENT)
Age: 84
LOS: 3 days | Discharge: NURSING, REHAB OR LONG-TERM CARE | DRG: 689 | End: 2019-09-13
Attending: EMERGENCY MEDICINE | Admitting: INTERNAL MEDICINE
Payer: MEDICARE

## 2019-09-09 DIAGNOSIS — R41.82 ALTERED MENTAL STATUS, UNSPECIFIED ALTERED MENTAL STATUS TYPE: ICD-10-CM

## 2019-09-09 DIAGNOSIS — N18.9 CHRONIC KIDNEY DISEASE, UNSPECIFIED CKD STAGE: Primary | ICD-10-CM

## 2019-09-09 DIAGNOSIS — N30.00 ACUTE CYSTITIS WITHOUT HEMATURIA: ICD-10-CM

## 2019-09-09 LAB
ALBUMIN SERPL-MCNC: 3.9 GM/DL (ref 3.4–5)
ALP BLD-CCNC: 102 IU/L (ref 40–129)
ALT SERPL-CCNC: 24 U/L (ref 10–40)
ANION GAP SERPL CALCULATED.3IONS-SCNC: 12 MMOL/L (ref 4–16)
AST SERPL-CCNC: 24 IU/L (ref 15–37)
BASOPHILS ABSOLUTE: 0.1 K/CU MM
BASOPHILS RELATIVE PERCENT: 1.4 % (ref 0–1)
BILIRUB SERPL-MCNC: 0.2 MG/DL (ref 0–1)
BUN BLDV-MCNC: 33 MG/DL (ref 6–23)
CALCIUM SERPL-MCNC: 9.6 MG/DL (ref 8.3–10.6)
CHLORIDE BLD-SCNC: 106 MMOL/L (ref 99–110)
CO2: 23 MMOL/L (ref 21–32)
CREAT SERPL-MCNC: 2.6 MG/DL (ref 0.9–1.3)
DIFFERENTIAL TYPE: ABNORMAL
EOSINOPHILS ABSOLUTE: 0.8 K/CU MM
EOSINOPHILS RELATIVE PERCENT: 8.3 % (ref 0–3)
GFR AFRICAN AMERICAN: 28 ML/MIN/1.73M2
GFR NON-AFRICAN AMERICAN: 23 ML/MIN/1.73M2
GLUCOSE BLD-MCNC: 112 MG/DL (ref 70–99)
HCT VFR BLD CALC: 43.3 % (ref 42–52)
HEMOGLOBIN: 13.3 GM/DL (ref 13.5–18)
IMMATURE NEUTROPHIL %: 0.3 % (ref 0–0.43)
LYMPHOCYTES ABSOLUTE: 1.4 K/CU MM
LYMPHOCYTES RELATIVE PERCENT: 15.6 % (ref 24–44)
MCH RBC QN AUTO: 30.1 PG (ref 27–31)
MCHC RBC AUTO-ENTMCNC: 30.7 % (ref 32–36)
MCV RBC AUTO: 98 FL (ref 78–100)
MONOCYTES ABSOLUTE: 0.6 K/CU MM
MONOCYTES RELATIVE PERCENT: 6.6 % (ref 0–4)
NUCLEATED RBC %: 0 %
PDW BLD-RTO: 14 % (ref 11.7–14.9)
PLATELET # BLD: 254 K/CU MM (ref 140–440)
PMV BLD AUTO: 9.9 FL (ref 7.5–11.1)
POTASSIUM SERPL-SCNC: 4.9 MMOL/L (ref 3.5–5.1)
RBC # BLD: 4.42 M/CU MM (ref 4.6–6.2)
SEGMENTED NEUTROPHILS ABSOLUTE COUNT: 6.2 K/CU MM
SEGMENTED NEUTROPHILS RELATIVE PERCENT: 67.8 % (ref 36–66)
SODIUM BLD-SCNC: 141 MMOL/L (ref 135–145)
TOTAL IMMATURE NEUTOROPHIL: 0.03 K/CU MM
TOTAL NUCLEATED RBC: 0 K/CU MM
TOTAL PROTEIN: 7.1 GM/DL (ref 6.4–8.2)
TSH HIGH SENSITIVITY: 2.25 UIU/ML (ref 0.27–4.2)
WBC # BLD: 9.2 K/CU MM (ref 4–10.5)

## 2019-09-09 PROCEDURE — 80053 COMPREHEN METABOLIC PANEL: CPT

## 2019-09-09 PROCEDURE — 6360000002 HC RX W HCPCS: Performed by: EMERGENCY MEDICINE

## 2019-09-09 PROCEDURE — 99285 EMERGENCY DEPT VISIT HI MDM: CPT

## 2019-09-09 PROCEDURE — 85025 COMPLETE CBC W/AUTO DIFF WBC: CPT

## 2019-09-09 PROCEDURE — 96375 TX/PRO/DX INJ NEW DRUG ADDON: CPT

## 2019-09-09 PROCEDURE — 36415 COLL VENOUS BLD VENIPUNCTURE: CPT

## 2019-09-09 PROCEDURE — 93005 ELECTROCARDIOGRAM TRACING: CPT | Performed by: EMERGENCY MEDICINE

## 2019-09-09 PROCEDURE — 84443 ASSAY THYROID STIM HORMONE: CPT

## 2019-09-09 RX ORDER — LORAZEPAM 2 MG/ML
INJECTION INTRAMUSCULAR
Status: DISPENSED
Start: 2019-09-09 | End: 2019-09-10

## 2019-09-09 RX ORDER — LORAZEPAM 2 MG/ML
2 INJECTION INTRAMUSCULAR ONCE
Status: COMPLETED | OUTPATIENT
Start: 2019-09-09 | End: 2019-09-09

## 2019-09-09 RX ADMIN — LORAZEPAM 2 MG: 2 INJECTION INTRAMUSCULAR; INTRAVENOUS at 20:25

## 2019-09-10 PROBLEM — N39.0 UTI (URINARY TRACT INFECTION): Status: ACTIVE | Noted: 2019-09-10

## 2019-09-10 LAB
BACTERIA: ABNORMAL /HPF
BILIRUBIN URINE: NEGATIVE MG/DL
BLOOD, URINE: ABNORMAL
CLARITY: ABNORMAL
COLOR: YELLOW
GLUCOSE, URINE: NEGATIVE MG/DL
INR BLD: 1.62 INDEX
KETONES, URINE: NEGATIVE MG/DL
LEUKOCYTE ESTERASE, URINE: ABNORMAL
MUCUS: ABNORMAL HPF
NITRITE URINE, QUANTITATIVE: POSITIVE
PH, URINE: 5 (ref 5–8)
PROTEIN UA: 30 MG/DL
PROTHROMBIN TIME: 18.6 SECONDS (ref 9.12–12.5)
RBC URINE: 3 /HPF (ref 0–3)
SPECIFIC GRAVITY UA: 1.01 (ref 1–1.03)
SQUAMOUS EPITHELIAL: 9 /HPF
TRICHOMONAS: ABNORMAL /HPF
UROBILINOGEN, URINE: NORMAL MG/DL (ref 0.2–1)
WBC CLUMP: ABNORMAL /HPF
WBC UA: 428 /HPF (ref 0–2)

## 2019-09-10 PROCEDURE — 6370000000 HC RX 637 (ALT 250 FOR IP): Performed by: INTERNAL MEDICINE

## 2019-09-10 PROCEDURE — 87086 URINE CULTURE/COLONY COUNT: CPT

## 2019-09-10 PROCEDURE — 96376 TX/PRO/DX INJ SAME DRUG ADON: CPT

## 2019-09-10 PROCEDURE — 85610 PROTHROMBIN TIME: CPT

## 2019-09-10 PROCEDURE — 81001 URINALYSIS AUTO W/SCOPE: CPT

## 2019-09-10 PROCEDURE — 99211 OFF/OP EST MAY X REQ PHY/QHP: CPT

## 2019-09-10 PROCEDURE — 93010 ELECTROCARDIOGRAM REPORT: CPT | Performed by: INTERNAL MEDICINE

## 2019-09-10 PROCEDURE — 96365 THER/PROPH/DIAG IV INF INIT: CPT

## 2019-09-10 PROCEDURE — 1200000000 HC SEMI PRIVATE

## 2019-09-10 PROCEDURE — 2580000003 HC RX 258: Performed by: EMERGENCY MEDICINE

## 2019-09-10 PROCEDURE — 6360000002 HC RX W HCPCS: Performed by: EMERGENCY MEDICINE

## 2019-09-10 PROCEDURE — 2580000003 HC RX 258: Performed by: INTERNAL MEDICINE

## 2019-09-10 PROCEDURE — 36415 COLL VENOUS BLD VENIPUNCTURE: CPT

## 2019-09-10 PROCEDURE — 6360000002 HC RX W HCPCS: Performed by: INTERNAL MEDICINE

## 2019-09-10 RX ORDER — ACETAMINOPHEN 500 MG
500 TABLET ORAL EVERY 6 HOURS PRN
Status: DISCONTINUED | OUTPATIENT
Start: 2019-09-10 | End: 2019-09-13 | Stop reason: HOSPADM

## 2019-09-10 RX ORDER — ROSUVASTATIN CALCIUM 5 MG/1
5 TABLET, COATED ORAL NIGHTLY
Status: DISCONTINUED | OUTPATIENT
Start: 2019-09-10 | End: 2019-09-13 | Stop reason: HOSPADM

## 2019-09-10 RX ORDER — LORAZEPAM 2 MG/ML
1 INJECTION INTRAMUSCULAR ONCE
Status: COMPLETED | OUTPATIENT
Start: 2019-09-10 | End: 2019-09-10

## 2019-09-10 RX ORDER — LISINOPRIL 20 MG/1
20 TABLET ORAL 2 TIMES DAILY
Status: DISCONTINUED | OUTPATIENT
Start: 2019-09-11 | End: 2019-09-13 | Stop reason: HOSPADM

## 2019-09-10 RX ORDER — ALLOPURINOL 300 MG/1
300 TABLET ORAL DAILY
Status: DISCONTINUED | OUTPATIENT
Start: 2019-09-10 | End: 2019-09-10

## 2019-09-10 RX ORDER — FLUTICASONE PROPIONATE 50 MCG
2 SPRAY, SUSPENSION (ML) NASAL DAILY PRN
Status: DISCONTINUED | OUTPATIENT
Start: 2019-09-10 | End: 2019-09-13 | Stop reason: HOSPADM

## 2019-09-10 RX ORDER — IMIPRAMINE HCL 25 MG
50 TABLET ORAL NIGHTLY
Status: DISCONTINUED | OUTPATIENT
Start: 2019-09-10 | End: 2019-09-13 | Stop reason: HOSPADM

## 2019-09-10 RX ORDER — ONDANSETRON 4 MG/1
4 TABLET, ORALLY DISINTEGRATING ORAL EVERY 6 HOURS PRN
Status: DISCONTINUED | OUTPATIENT
Start: 2019-09-10 | End: 2019-09-13 | Stop reason: HOSPADM

## 2019-09-10 RX ORDER — CLONIDINE HYDROCHLORIDE 0.1 MG/1
0.1 TABLET ORAL 4 TIMES DAILY PRN
Status: DISCONTINUED | OUTPATIENT
Start: 2019-09-10 | End: 2019-09-13 | Stop reason: HOSPADM

## 2019-09-10 RX ORDER — METOPROLOL TARTRATE 50 MG/1
50 TABLET, FILM COATED ORAL 2 TIMES DAILY
Status: DISCONTINUED | OUTPATIENT
Start: 2019-09-10 | End: 2019-09-13 | Stop reason: HOSPADM

## 2019-09-10 RX ORDER — HALOPERIDOL 5 MG/ML
2 INJECTION INTRAMUSCULAR ONCE
Status: COMPLETED | OUTPATIENT
Start: 2019-09-10 | End: 2019-09-10

## 2019-09-10 RX ORDER — TIMOLOL MALEATE 5 MG/ML
1 SOLUTION/ DROPS OPHTHALMIC DAILY
Status: DISCONTINUED | OUTPATIENT
Start: 2019-09-10 | End: 2019-09-13 | Stop reason: HOSPADM

## 2019-09-10 RX ORDER — ALLOPURINOL 100 MG/1
200 TABLET ORAL DAILY
Status: DISCONTINUED | OUTPATIENT
Start: 2019-09-11 | End: 2019-09-13 | Stop reason: HOSPADM

## 2019-09-10 RX ORDER — PENTOXIFYLLINE 400 MG/1
400 TABLET, EXTENDED RELEASE ORAL 2 TIMES DAILY
Status: DISCONTINUED | OUTPATIENT
Start: 2019-09-10 | End: 2019-09-13 | Stop reason: HOSPADM

## 2019-09-10 RX ORDER — LATANOPROST 50 UG/ML
1 SOLUTION/ DROPS OPHTHALMIC NIGHTLY
Status: DISCONTINUED | OUTPATIENT
Start: 2019-09-10 | End: 2019-09-13 | Stop reason: HOSPADM

## 2019-09-10 RX ORDER — WARFARIN SODIUM 4 MG/1
4 TABLET ORAL DAILY
Status: DISCONTINUED | OUTPATIENT
Start: 2019-09-10 | End: 2019-09-13

## 2019-09-10 RX ORDER — FLUDROCORTISONE ACETATE 0.1 MG/1
0.1 TABLET ORAL DAILY
Status: DISCONTINUED | OUTPATIENT
Start: 2019-09-10 | End: 2019-09-13 | Stop reason: HOSPADM

## 2019-09-10 RX ORDER — LEVOTHYROXINE SODIUM 0.07 MG/1
75 TABLET ORAL DAILY
Status: DISCONTINUED | OUTPATIENT
Start: 2019-09-10 | End: 2019-09-13 | Stop reason: HOSPADM

## 2019-09-10 RX ORDER — ASPIRIN 81 MG/1
81 TABLET, CHEWABLE ORAL DAILY
Status: DISCONTINUED | OUTPATIENT
Start: 2019-09-10 | End: 2019-09-13 | Stop reason: HOSPADM

## 2019-09-10 RX ORDER — DULOXETIN HYDROCHLORIDE 20 MG/1
20 CAPSULE, DELAYED RELEASE ORAL DAILY
Status: DISCONTINUED | OUTPATIENT
Start: 2019-09-10 | End: 2019-09-13 | Stop reason: HOSPADM

## 2019-09-10 RX ADMIN — METOPROLOL TARTRATE 50 MG: 50 TABLET ORAL at 08:43

## 2019-09-10 RX ADMIN — HALOPERIDOL LACTATE 2 MG: 5 INJECTION, SOLUTION INTRAMUSCULAR at 18:22

## 2019-09-10 RX ADMIN — LEVOTHYROXINE SODIUM 75 MCG: 75 TABLET ORAL at 05:15

## 2019-09-10 RX ADMIN — ASPIRIN 81 MG 81 MG: 81 TABLET ORAL at 08:43

## 2019-09-10 RX ADMIN — HYDRALAZINE HYDROCHLORIDE 10 MG: 20 INJECTION INTRAMUSCULAR; INTRAVENOUS at 23:23

## 2019-09-10 RX ADMIN — ALLOPURINOL 300 MG: 300 TABLET ORAL at 08:43

## 2019-09-10 RX ADMIN — PENTOXIFYLLINE 400 MG: 400 TABLET, EXTENDED RELEASE ORAL at 08:43

## 2019-09-10 RX ADMIN — TIMOLOL MALEATE 1 DROP: 5 SOLUTION/ DROPS OPHTHALMIC at 08:44

## 2019-09-10 RX ADMIN — ROSUVASTATIN CALCIUM 5 MG: 5 TABLET, COATED ORAL at 22:30

## 2019-09-10 RX ADMIN — PENTOXIFYLLINE 400 MG: 400 TABLET, EXTENDED RELEASE ORAL at 22:29

## 2019-09-10 RX ADMIN — IMIPRAMINE HYDROCHLORIDE 50 MG: 25 TABLET ORAL at 22:30

## 2019-09-10 RX ADMIN — CLONIDINE HYDROCHLORIDE 0.1 MG: 0.1 TABLET ORAL at 05:15

## 2019-09-10 RX ADMIN — LATANOPROST 1 DROP: 50 SOLUTION/ DROPS OPHTHALMIC at 22:31

## 2019-09-10 RX ADMIN — CEFTRIAXONE 1 G: 1 INJECTION, POWDER, FOR SOLUTION INTRAMUSCULAR; INTRAVENOUS at 10:49

## 2019-09-10 RX ADMIN — FLUDROCORTISONE ACETATE 0.1 MG: 0.1 TABLET ORAL at 08:43

## 2019-09-10 RX ADMIN — CEFTRIAXONE SODIUM 1 G: 1 INJECTION, POWDER, FOR SOLUTION INTRAMUSCULAR; INTRAVENOUS at 01:24

## 2019-09-10 RX ADMIN — DULOXETINE HYDROCHLORIDE 20 MG: 20 CAPSULE, DELAYED RELEASE ORAL at 08:43

## 2019-09-10 RX ADMIN — LORAZEPAM 1 MG: 2 INJECTION INTRAMUSCULAR; INTRAVENOUS at 03:11

## 2019-09-10 RX ADMIN — METOPROLOL TARTRATE 50 MG: 50 TABLET ORAL at 22:29

## 2019-09-10 ASSESSMENT — PAIN SCALES - GENERAL: PAINLEVEL_OUTOF10: 0

## 2019-09-10 NOTE — CONSULTS
RENAL DOSE ADJUSTMENT MADE PER P/T PROTOCOL    PREVIOUS ORDER:  Allopurinol 300mg po daily    Estimated Creatinine Clearance: 19 mL/min (A) (based on SCr of 2.6 mg/dL (H)). Recent Labs     09/09/19  2154 09/10/19  0532   BUN 33*  --    CREATININE 2.6*  --      --    INR  --  1.62     NEW RENALLY ADJUSTED ORDER:  ALLOPURINOL 200MG PO DAILY    Anne Mai.  SHARMILA Chowdary ROBINA [facility]  9/10/2019 4:54 PM  __________________________________________________

## 2019-09-10 NOTE — PROGRESS NOTES
Patient sitting on side of bed yelling \"help\" voices he needs to find his car to go home. Patient will not stop yelling \"help\" and will not tell anyone his needs. Patient is clean and dry, refuses to walk or stand. 1:1 provided but not effective, patient continues yelling \"help\". Crackers, juice, water, crossword puzzle provided, patient throw all items off table. Patient left on side of bed with bed alarm on and bedside table in front of patient.

## 2019-09-10 NOTE — H&P
Hyperlipidemia     Hypertension     Hypothyroidism     Pneumonia     Thyroid disease      Past Surgical History:   Procedure Laterality Date    ABDOMEN SURGERY      BLADDER SUSPENSION      CARDIAC CATHETERIZATION  5/28/15    No evidence of hemodynamically significant CAD    COLOSTOMY Right     DILATATION, ESOPHAGUS      JOINT REPLACEMENT      PROSTATECTOMY      REVISION TOTAL HIP ARTHROPLASTY Right 2017     Family History   Problem Relation Age of Onset    Cancer Father     Cancer Brother     High Blood Pressure Grandchild     High Blood Pressure Daughter      Family Hx of HTN  Family Hx as reviewed above, otherwise non-contributory  Social History     Socioeconomic History    Marital status:       Spouse name: None    Number of children: None    Years of education: None    Highest education level: None   Occupational History    None   Social Needs    Financial resource strain: None    Food insecurity:     Worry: None     Inability: None    Transportation needs:     Medical: None     Non-medical: None   Tobacco Use    Smoking status: Former Smoker     Last attempt to quit: 1978     Years since quittin.9    Smokeless tobacco: Never Used    Tobacco comment: quit in    Substance and Sexual Activity    Alcohol use: No     Alcohol/week: 0.0 standard drinks    Drug use: No    Sexual activity: None   Lifestyle    Physical activity:     Days per week: None     Minutes per session: None    Stress: None   Relationships    Social connections:     Talks on phone: None     Gets together: None     Attends Cheondoism service: None     Active member of club or organization: None     Attends meetings of clubs or organizations: None     Relationship status: None    Intimate partner violence:     Fear of current or ex partner: None     Emotionally abused: None     Physically abused: None     Forced sexual activity: None   Other Topics Concern    None   Social History

## 2019-09-10 NOTE — CONSULTS
[facility] Continence Nurse  Consult Note       Steffanie Mayes  AGE: 80 y.o. GENDER: male  : 10/28/1927  TODAY'S DATE:  9/10/2019    Subjective:     Reason for Evaluation and Assessment: rodriguez Mayes is a 80 y.o. male referred by:   [x] Physician  [] Nursing  [] Other:     Wound Identification:  Wound Type: skin tear  Contributing Factors: shear force        PAST MEDICAL HISTORY        Diagnosis Date    [facility]     Arthritis     Cancer ([facility]     Prostate    Cerebral artery occlusion with cerebral infarction ([facility]     Chronic combined systolic and diastolic congestive heart failure ([facility] 2016    Chronic kidney disease     Chronic kidney disease (CKD)     Stage III    Cognitive communication deficit     Depression     Difficulty in walking     DVT (deep venous thrombosis) ([facility]     patient is on coumadin    H/O anemia of chronic renal failure     H/O CHF     H/O percutaneous left heart catheterization 5/28/15    No evidence of hemodynamically significant CAD    History of blood transfusion     History of Holter monitoring     Rhythm was Sinus, ventricular in single and couplet forms. Superventricular ectopics in single,pair and run forms.  History of nuclear stress test 5/13/15    EF 41%. Dilated cardiomyopathy with reduction of LV function. Mod-Severe superimposed ischemia in Basa, Mid, Apical Inferior regions.     Hx of blood clots     Hx of echocardiogram 2017    EF35-45%,mild tricuspid regurg,mild to moderate PHTN    HX OTHER MEDICAL 10/09/2017    [id number]%    Hyperlipidemia     Hypertension     Hypothyroidism     Pneumonia     Thyroid disease        PAST SURGICAL HISTORY    Past Surgical History:   Procedure Laterality Date    ABDOMEN SURGERY      BLADDER SUSPENSION      CARDIAC CATHETERIZATION  5/28/15    No evidence of hemodynamically significant CAD    COLOSTOMY Right 1989    DILATATION, ESOPHAGUS      JOINT

## 2019-09-10 NOTE — ED PROVIDER NOTES
Triage Chief Complaint:   Altered Mental Status      [place]:  Steff Oden is a 80 y.o. male that presents to the emergency department from what it [facility]. Patient does have dementia and has sundowners. Daughter states this happens occasionally when he gets UTIs but he seems worse and more agitated than usual.  . Has been swinging cussing and agitated at staff and at EMS. Chart review patient has a history of chronic kidney disease, history of prostate cancer in the past, DVT on Coumadin, CHF hypertension, hyper lipidemia, thyroid abnormality. .     Past Medical History:   Diagnosis Date    [facility]F) [facility]     Arthritis     Cancer ([facility] 1990    Prostate    Cerebral artery occlusion with cerebral infarction ([facility]     Chronic combined systolic and diastolic congestive heart failure ([facility] [facility] 7/19/2016    Chronic kidney disease     Chronic kidney disease (CKD)     Stage III    Cognitive communication deficit     Depression     Difficulty in walking     DVT (deep venous thrombosis) (HCC)     patient is on coumadin    H/O anemia of chronic renal failure     H/O CHF     H/O percutaneous left heart catheterization 5/28/15    No evidence of hemodynamically significant CAD    History of blood transfusion     History of Holter monitoring     Rhythm was Sinus, ventricular in single and couplet forms. Superventricular ectopics in single,pair and run forms.  History of nuclear stress test 5/13/15    EF 41%. Dilated cardiomyopathy with reduction of LV function. Mod-Severe superimposed ischemia in Basa, Mid, Apical Inferior regions.     Hx of blood clots     Hx of echocardiogram 08/28/2017    [id number]%,mild tricuspid regurg,mild to moderate PHTN    HX OTHER MEDICAL 10/09/2017    [id number]%    Hyperlipidemia     Hypertension     Hypothyroidism     Pneumonia     Thyroid disease      Past Surgical History:   Procedure Laterality Date    ABDOMEN SURGERY      BLADDER SUSPENSION      CARDIAC Provider Last Rate Last Dose    cefTRIAXone (ROCEPHIN) 1 g IVPB in 50 mL D5W minibag  1 g Intravenous Once Aleksandra Barrios MD         Current Outpatient Medications   Medication Sig Dispense Refill    ketoconazole (NIZORAL) 2 % shampoo Apply topically daily Apply topically daily as needed.  cloNIDine (CATAPRES) 0.3 MG tablet Take 0.3 mg by mouth daily Take only if systolic [id number]      lisinopril (PRINIVIL;ZESTRIL) 40 MG tablet Take 40 mg by mouth 2 times daily      DULoxetine (CYMBALTA) 20 MG extended release capsule Take 20 mg by mouth daily      metoprolol tartrate (LOPRESSOR) 25 MG tablet Take 1 tablet by mouth 2 times daily for 3 days (Patient taking differently: Take 50 mg by mouth 2 times daily ) 6 tablet 0    WARFARIN SODIUM PO Take by mouth See Admin Instructions 12/11/18 Per MAR patient receives 2 mg on the 1st,2nd,3rd,4th,5th and 6th day of each month and 3 mg through the rest of each day the rest of the month. Per nursing he takes at HS      latanoprost (XALATAN) 0.005 % ophthalmic solution Place 1 drop into the right eye nightly      rosuvastatin (CRESTOR) 5 MG tablet Take 5 mg by mouth nightly       fludrocortisone (FLORINEF) 0.1 MG tablet Take 0.1 mg by mouth daily      ondansetron (ZOFRAN) 4 MG tablet Take 4 mg by mouth every 6 hours as needed for Nausea or Vomiting      aspirin 81 MG chewable tablet Take 1 tablet by mouth daily 30 tablet 0    fluconazole (DIFLUCAN) 200 MG tablet Take 200 mg by mouth daily Once daily for the first 5 days of each month.       allopurinol (ZYLOPRIM) 300 MG tablet Take 300 mg by mouth daily      tobramycin (TOBREX) 0.3 % ophthalmic solution PLACE 1 DROP IN LEFT EYE EVERY 3 HOURS AS NEEDED  0    ranitidine (ZANTAC) 150 MG capsule Take 150 mg by mouth daily as needed       imipramine (TOFRANIL) 50 MG tablet Take 50 mg by mouth nightly      acetaminophen (TYLENOL) 500 MG tablet Take 500 mg by mouth every 6 hours as needed for Pain       254 140 - 440 K/CU MM    MPV 9.9 7.5 - 11.1 FL    Differential Type AUTOMATED DIFFERENTIAL     Segs Relative 67.8 (H) 36 - 66 %    Lymphocytes % 15.6 (L) 24 - 44 %    Monocytes % 6.6 (H) 0 - 4 %    Eosinophils % 8.3 (H) 0 - 3 %    Basophils % 1.4 (H) 0 - 1 %    Segs Absolute 6.2 K/CU MM    Lymphocytes Absolute 1.4 K/CU MM    Monocytes Absolute 0.6 K/CU MM    Eosinophils Absolute 0.8 K/CU MM    Basophils Absolute 0.1 K/CU MM    Nucleated RBC % 0.0 %    Total Nucleated RBC 0.0 K/CU MM    Total Immature Neutrophil 0.03 K/CU MM    Immature Neutrophil % 0.3 0 - 0.43 %   CMP   Result Value Ref Range    Sodium 141 135 - 145 MMOL/L    Potassium 4.9 3.5 - 5.1 MMOL/L    Chloride 106 99 - 110 mMol/L    CO2 23 21 - 32 MMOL/L    BUN 33 (H) 6 - 23 MG/DL    CREATININE 2.6 (H) 0.9 - 1.3 MG/DL    Glucose 112 (H) 70 - 99 MG/DL    Calcium 9.6 8.3 - 10.6 MG/DL    Alb 3.9 3.4 - 5.0 GM/DL    Total Protein 7.1 6.4 - 8.2 GM/DL    Total Bilirubin 0.2 0.0 - 1.0 MG/DL    ALT 24 10 - 40 U/L    AST 24 15 - 37 IU/L    Alkaline Phosphatase 102 40 - 129 IU/L    GFR Non- 23 (L) >60 mL/min/1.73m2    GFR  28 (L) >60 mL/min/1.73m2    Anion Gap 12 4 - 16   Urinalysis with microscopic   Result Value Ref Range    Color, UA YELLOW YELLOW    Clarity, UA SLIGHTLY CLOUDY (A) CLEAR    Glucose, Urine NEGATIVE NEGATIVE MG/DL    Bilirubin Urine NEGATIVE NEGATIVE MG/DL    Ketones, Urine NEGATIVE NEGATIVE MG/DL    Specific Gravity, UA 1.009 1.001 - 1.035    Blood, Urine SMALL (A) NEGATIVE    pH, Urine 5.0 5.0 - 8.0    Protein, UA 30 (A) NEGATIVE MG/DL    Urobilinogen, Urine NORMAL 0.2 - 1.0 MG/DL    Nitrite Urine, Quantitative POSITIVE (A) NEGATIVE    Leukocyte Esterase, Urine LARGE (A) NEGATIVE    RBC, UA 3 0 - 3 /HPF    WBC,  (H) 0 - 2 /HPF    Bacteria, UA FEW (A) NEGATIVE /HPF    WBC Clumps, UA FEW /HPF    Squam Epithel, UA 9 /HPF    Mucus, UA RARE (A) NEGATIVE HPF    Trichomonas, UA NONE SEEN NONE SEEN /HPF   TSH without Reflex   Result Value Ref Range    TSH, High Sensitivity 2.250 0.270 - 4.20 uIu/ml   EKG 12 Lead   Result Value Ref Range    Ventricular Rate 70 BPM    Atrial Rate 70 BPM    P-R Interval 226 ms    QRS Duration 102 ms    Q-T Interval 406 ms    QTc Calculation (Bazett) 438 ms    P Axis 84 degrees    R Axis 4 degrees    T Axis 59 degrees    Diagnosis       Sinus rhythm with 1st degree AV block  Nonspecific ST and T wave abnormality  Abnormal ECG  When compared with ECG of 11-DEC-2018 12:32,  No significant change was found          Radiographs:  [] Radiologist's Wet Read Report Reviewed:     [] Discussed with Radiologist:     [] The following radiograph was interpreted by myself in the absence of a radiologist:     EKG: (All EKG's are interpreted by myself in the absence of a cardiologist)  The Ekg interpreted by me shows  normal sinus rhythm with a rate of 70 with first degree AV block   Axis is   Normal  QTc is  normal  Intervals and Durations are unremarkable. ST Segments: no acute change  No significant change from prior EKG dated 12-           MDM:  Patient's vital signs are stable. Given 2 of Ativan IM to help with his agitation. Patient has come down and is able to tell us that he does not want his daughter in the room because he is upset that she made him come. He states he does not have any pain. Has been eating and drinking well. Denies any abdominal pain. Moves all extremity. Strength is normal.  Sensation intact. He is alert and oriented to self place and situation but not time. Patient allowed us to take his blood and obtain an EKG. patient CBC shows a normal white count of 9.2. Hemoglobin of 13.3. Rectal lites show chronic renal disease with a creatinine of 2.6. This appears to be around his baseline. TSH is normal.  Urinalysis does show positive nitrates, large leuks, 428 WBCs with bacteria. Started on Rocephin IV.   Patient will be admitted for further eval and

## 2019-09-10 NOTE — ED TRIAGE NOTES
Pt presents to ED for AMS from [facility]. Pt has hx of dementia with sun downers. Pt agitated at this time.

## 2019-09-11 ENCOUNTER — APPOINTMENT (OUTPATIENT)
Dept: CT IMAGING | Age: 84
DRG: 689 | End: 2019-09-11
Payer: MEDICARE

## 2019-09-11 LAB
ANION GAP SERPL CALCULATED.3IONS-SCNC: 11 MMOL/L (ref 4–16)
APTT: 28.7 SECONDS (ref 21.2–33)
BUN BLDV-MCNC: 30 MG/DL (ref 6–23)
CALCIUM SERPL-MCNC: 9.5 MG/DL (ref 8.3–10.6)
CHLORIDE BLD-SCNC: 108 MMOL/L (ref 99–110)
CO2: 24 MMOL/L (ref 21–32)
CREAT SERPL-MCNC: 2.3 MG/DL (ref 0.9–1.3)
CULTURE: ABNORMAL
CULTURE: ABNORMAL
GFR AFRICAN AMERICAN: 32 ML/MIN/1.73M2
GFR NON-AFRICAN AMERICAN: 27 ML/MIN/1.73M2
GLUCOSE BLD-MCNC: 93 MG/DL (ref 70–99)
HCT VFR BLD CALC: 40.5 % (ref 42–52)
HEMOGLOBIN: 12.7 GM/DL (ref 13.5–18)
INR BLD: 1.53 INDEX
Lab: ABNORMAL
MCH RBC QN AUTO: 30.2 PG (ref 27–31)
MCHC RBC AUTO-ENTMCNC: 31.4 % (ref 32–36)
MCV RBC AUTO: 96.4 FL (ref 78–100)
PDW BLD-RTO: 14.2 % (ref 11.7–14.9)
PLATELET # BLD: 240 K/CU MM (ref 140–440)
PMV BLD AUTO: 10.7 FL (ref 7.5–11.1)
POTASSIUM SERPL-SCNC: 4.3 MMOL/L (ref 3.5–5.1)
PROCALCITONIN: 0.08
PROTHROMBIN TIME: 17.5 SECONDS (ref 9.12–12.5)
RBC # BLD: 4.2 M/CU MM (ref 4.6–6.2)
SODIUM BLD-SCNC: 143 MMOL/L (ref 135–145)
SPECIMEN: ABNORMAL
TOTAL COLONY COUNT: ABNORMAL
WBC # BLD: 8.7 K/CU MM (ref 4–10.5)

## 2019-09-11 PROCEDURE — 51701 INSERT BLADDER CATHETER: CPT

## 2019-09-11 PROCEDURE — 36415 COLL VENOUS BLD VENIPUNCTURE: CPT

## 2019-09-11 PROCEDURE — 6360000002 HC RX W HCPCS: Performed by: INTERNAL MEDICINE

## 2019-09-11 PROCEDURE — 51798 US URINE CAPACITY MEASURE: CPT

## 2019-09-11 PROCEDURE — 85730 THROMBOPLASTIN TIME PARTIAL: CPT

## 2019-09-11 PROCEDURE — 2580000003 HC RX 258: Performed by: INTERNAL MEDICINE

## 2019-09-11 PROCEDURE — 6370000000 HC RX 637 (ALT 250 FOR IP): Performed by: HOSPITALIST

## 2019-09-11 PROCEDURE — 80048 BASIC METABOLIC PNL TOTAL CA: CPT

## 2019-09-11 PROCEDURE — 85610 PROTHROMBIN TIME: CPT

## 2019-09-11 PROCEDURE — 87186 SC STD MICRODIL/AGAR DIL: CPT

## 2019-09-11 PROCEDURE — 1200000000 HC SEMI PRIVATE

## 2019-09-11 PROCEDURE — 84145 PROCALCITONIN (PCT): CPT

## 2019-09-11 PROCEDURE — 6370000000 HC RX 637 (ALT 250 FOR IP): Performed by: INTERNAL MEDICINE

## 2019-09-11 PROCEDURE — 87077 CULTURE AEROBIC IDENTIFY: CPT

## 2019-09-11 PROCEDURE — 87086 URINE CULTURE/COLONY COUNT: CPT

## 2019-09-11 PROCEDURE — 85027 COMPLETE CBC AUTOMATED: CPT

## 2019-09-11 PROCEDURE — 70450 CT HEAD/BRAIN W/O DYE: CPT

## 2019-09-11 RX ORDER — DONEPEZIL HYDROCHLORIDE 5 MG/1
5 TABLET, FILM COATED ORAL NIGHTLY
Status: DISCONTINUED | OUTPATIENT
Start: 2019-09-11 | End: 2019-09-13 | Stop reason: HOSPADM

## 2019-09-11 RX ORDER — HALOPERIDOL 5 MG/ML
2 INJECTION INTRAMUSCULAR EVERY 6 HOURS PRN
Status: DISCONTINUED | OUTPATIENT
Start: 2019-09-11 | End: 2019-09-13 | Stop reason: HOSPADM

## 2019-09-11 RX ADMIN — LATANOPROST 1 DROP: 50 SOLUTION/ DROPS OPHTHALMIC at 23:03

## 2019-09-11 RX ADMIN — ASPIRIN 81 MG 81 MG: 81 TABLET ORAL at 09:18

## 2019-09-11 RX ADMIN — LEVOTHYROXINE SODIUM 75 MCG: 75 TABLET ORAL at 09:18

## 2019-09-11 RX ADMIN — PENTOXIFYLLINE 400 MG: 400 TABLET, EXTENDED RELEASE ORAL at 23:03

## 2019-09-11 RX ADMIN — PENTOXIFYLLINE 400 MG: 400 TABLET, EXTENDED RELEASE ORAL at 09:18

## 2019-09-11 RX ADMIN — CEFTRIAXONE 1 G: 1 INJECTION, POWDER, FOR SOLUTION INTRAMUSCULAR; INTRAVENOUS at 10:14

## 2019-09-11 RX ADMIN — ROSUVASTATIN CALCIUM 5 MG: 5 TABLET, COATED ORAL at 23:03

## 2019-09-11 RX ADMIN — DULOXETINE HYDROCHLORIDE 20 MG: 20 CAPSULE, DELAYED RELEASE ORAL at 09:18

## 2019-09-11 RX ADMIN — METOPROLOL TARTRATE 50 MG: 50 TABLET ORAL at 09:18

## 2019-09-11 RX ADMIN — ALLOPURINOL 200 MG: 100 TABLET ORAL at 09:18

## 2019-09-11 RX ADMIN — DONEPEZIL HYDROCHLORIDE 5 MG: 5 TABLET, FILM COATED ORAL at 16:16

## 2019-09-11 RX ADMIN — FLUDROCORTISONE ACETATE 0.1 MG: 0.1 TABLET ORAL at 09:18

## 2019-09-11 RX ADMIN — LISINOPRIL 20 MG: 20 TABLET ORAL at 09:18

## 2019-09-11 RX ADMIN — TIMOLOL MALEATE 1 DROP: 5 SOLUTION/ DROPS OPHTHALMIC at 09:25

## 2019-09-11 RX ADMIN — METOPROLOL TARTRATE 50 MG: 50 TABLET ORAL at 23:03

## 2019-09-11 RX ADMIN — LISINOPRIL 20 MG: 20 TABLET ORAL at 23:03

## 2019-09-11 RX ADMIN — WARFARIN SODIUM 4 MG: 4 TABLET ORAL at 11:53

## 2019-09-11 RX ADMIN — IMIPRAMINE HYDROCHLORIDE 50 MG: 25 TABLET ORAL at 23:04

## 2019-09-11 ASSESSMENT — PAIN SCALES - GENERAL: PAINLEVEL_OUTOF10: 0

## 2019-09-11 NOTE — PROGRESS NOTES
Physical Therapy  Attempted to see pt for PT rodriguez. KATARZYNA Eng requesting therapy hold this afternoon due to confusion/sundowners and see pt in the morning. Will continue to monitor and see pt once better able to participate.

## 2019-09-11 NOTE — PROGRESS NOTES
143 09/11/2019    K 4.3 09/11/2019     09/11/2019    CO2 24 09/11/2019    BUN 30 (H) 09/11/2019    CREATININE 2.3 (H) 09/11/2019    GLUCOSE 93 09/11/2019    CALCIUM 9.5 09/11/2019    PROT 7.1 09/09/2019    LABALBU 3.9 09/09/2019    BILITOT 0.2 09/09/2019    ALKPHOS 102 09/09/2019    AST 24 09/09/2019    ALT 24 09/09/2019    LABGLOM 27 (L) 09/11/2019    GFRAA 32 (L) 09/11/2019       Imaging films was personally reviewed with finding below. No images. Telemetry EKG strip was personally reviewed. NSR, No significant ST-T abnormalities. Assessment/Plan:     · Acute metabolic encephalopathy likely due to UTI, on underlying mild-moderate dementia  · Community acquired UTI on Rocephin  · No evidence of sepsis  · Left forearm laceration, traumatic. · CKD stage 4 at baseline  · Chronically anticoagulated with coumadin for h/o DVT  · Hypertension  · Depression  · GERD  · hypothoyroidism  · PAD  · Gout    - continue iv rocephin (9/10-) pending urine culture result. - symptoms control.   - check bladder scan per shift for 2 days. May need catheterization if he has urinary retention. - try to avoid sedatives. - med reconciliation reviewed. - Pt is full code. The above assessment/plan has been explained to the patient, who indicated understanding.     Tigist Corona MD  9/11/2019 8:20 AM

## 2019-09-11 NOTE — DISCHARGE INSTR - COC
9:46 AM   Wound Assessment Red 9/10/2019  9:46 AM   Drainage Amount Scant 9/10/2019  9:46 AM   Drainage Description Serosanguinous 9/10/2019  9:46 AM   Odor None 9/10/2019  9:46 AM   Margins Defined edges 9/10/2019  9:46 AM   [place]aley-wound Assessment Intact 9/10/2019  9:46 AM   Non-staged Wound Description Full thickness 9/10/2019  9:46 AM   [place]%Wound Bed 0 9/10/2019  9:46 AM   Red%Wound Bed 100 9/10/2019  9:46 AM   Yellow%Wound Bed 0 9/10/2019  9:46 AM   Black%Wound Bed 0 9/10/2019  9:46 AM   Purple%Wound Bed 0 9/10/2019  9:46 AM   Other%Wound Bed 0 9/10/2019  9:46 AM   Number of days: 1        Elimination:  Continence:   · Bowel: No  · Bladder: No  Urinary Catheter: None   Colostomy/Ileostomy/Ileal Conduit: No       Date of Last BM: 9/13/2019    Intake/Output Summary (Last 24 hours) at 9/11/2019 1126  Last data filed at 9/11/2019 1013  Gross per 24 hour   Intake 480 ml   Output 650 ml   Net -170 ml     I/O last 3 completed shifts:   In: 180 [P.O.:180]  Out: 600 [Stool:600]    Safety Concerns:     None    Impairments/Disabilities:      Hearing    Patient's personal belongings (please select all that are sent with patient):  None    RN SIGNATURE:  Electronically signed by De Avendano LPN on 0/41/03 at 1:29 PM    CASE MANAGEMENT/SOCIAL WORK SECTION    Inpatient Status Date: ***    Readmission Risk Assessment Score:  Readmission Risk              Risk of Unplanned Readmission:        29           Discharging to Facility/ Agency   · Name:   · Address:  · Phone:  · Fax:    Dialysis Facility (if applicable)   · Name:  · Address:  · Dialysis Schedule:  · Phone:  · Fax:    / signature: {Marlenagnature:[id number]}    PHYSICIAN SECTION  Nutrition Therapy:  Current Nutrition Therapy:   - Oral Diet:  Cardiac    Routes of Feeding: Oral  Liquids: No Restrictions  Daily Fluid Restriction: no  Last Modified Barium Swallow with Video (Video Swallowing Test): not done    Treatments at the Time of [facility] Discharge:   Respiratory Treatments: none. Oxygen Therapy:  is not on home oxygen therapy. Ventilator:    - No ventilator support    Rehab Therapies: Physical Therapy and Occupational Therapy  Weight Bearing Status/Restrictions: No weight bearing restirctions  Other Medical Equipment (for information only, NOT a DME order):  wheelchair, walker and hospital bed  Other Treatments: none. Prognosis: Fair    Condition at Discharge: Stable    Rehab Potential (if transferring to Rehab): Guarded    Recommended Labs or Other Treatments After Discharge: Ciprofloxacin 250mg po bid x4 days to complete UTI treatment. Started him on Aricept for dementia. Physician Certification: I certify the above information and transfer of Marek Simon  is necessary for the continuing treatment of the diagnosis listed and that he requires [facility] for greater 30 days.      Update Admission H&P: No change in H&P    PHYSICIAN SIGNATURE:  Electronically signed by Nini Trammell MD on 9/13/19 at 12:08 PM

## 2019-09-11 NOTE — CARE COORDINATION
CM reviewed chart. Pt confusion. CM spoke with [facility], they will accept pt back and are aware that he will probably need SNF and possible IV antibiotic (rocephin). Spoke with family, daughter was tearful, she states her dad does not want her in room. She does understand that this may be r/t UTI and change of facilities. Family is aware of plan to transfer back to [facility] when medically stable. PT/OT is ordered.

## 2019-09-12 LAB
ANION GAP SERPL CALCULATED.3IONS-SCNC: 11 MMOL/L (ref 4–16)
APTT: 28.7 SECONDS (ref 21.2–33)
BUN BLDV-MCNC: 34 MG/DL (ref 6–23)
CALCIUM SERPL-MCNC: 9.2 MG/DL (ref 8.3–10.6)
CHLORIDE BLD-SCNC: 105 MMOL/L (ref 99–110)
CO2: 24 MMOL/L (ref 21–32)
CREAT SERPL-MCNC: 2.5 MG/DL (ref 0.9–1.3)
GFR AFRICAN AMERICAN: 29 ML/MIN/1.73M2
GFR NON-AFRICAN AMERICAN: 24 ML/MIN/1.73M2
GLUCOSE BLD-MCNC: 98 MG/DL (ref 70–99)
INR BLD: 1.57 INDEX
POTASSIUM SERPL-SCNC: 4.2 MMOL/L (ref 3.5–5.1)
PROTHROMBIN TIME: 18 SECONDS (ref 9.12–12.5)
SODIUM BLD-SCNC: 140 MMOL/L (ref 135–145)

## 2019-09-12 PROCEDURE — 6360000002 HC RX W HCPCS: Performed by: INTERNAL MEDICINE

## 2019-09-12 PROCEDURE — 36415 COLL VENOUS BLD VENIPUNCTURE: CPT

## 2019-09-12 PROCEDURE — 2580000003 HC RX 258: Performed by: INTERNAL MEDICINE

## 2019-09-12 PROCEDURE — 85610 PROTHROMBIN TIME: CPT

## 2019-09-12 PROCEDURE — 6370000000 HC RX 637 (ALT 250 FOR IP): Performed by: HOSPITALIST

## 2019-09-12 PROCEDURE — 2580000003 HC RX 258: Performed by: HOSPITALIST

## 2019-09-12 PROCEDURE — 51798 US URINE CAPACITY MEASURE: CPT

## 2019-09-12 PROCEDURE — 97162 PT EVAL MOD COMPLEX 30 MIN: CPT

## 2019-09-12 PROCEDURE — 85730 THROMBOPLASTIN TIME PARTIAL: CPT

## 2019-09-12 PROCEDURE — 80048 BASIC METABOLIC PNL TOTAL CA: CPT

## 2019-09-12 PROCEDURE — 97116 GAIT TRAINING THERAPY: CPT

## 2019-09-12 PROCEDURE — 1200000000 HC SEMI PRIVATE

## 2019-09-12 PROCEDURE — 97166 OT EVAL MOD COMPLEX 45 MIN: CPT

## 2019-09-12 PROCEDURE — 97535 SELF CARE MNGMENT TRAINING: CPT

## 2019-09-12 PROCEDURE — 94761 N-INVAS EAR/PLS OXIMETRY MLT: CPT

## 2019-09-12 PROCEDURE — 6370000000 HC RX 637 (ALT 250 FOR IP): Performed by: INTERNAL MEDICINE

## 2019-09-12 RX ORDER — 0.9 % SODIUM CHLORIDE 0.9 %
500 INTRAVENOUS SOLUTION INTRAVENOUS ONCE
Status: COMPLETED | OUTPATIENT
Start: 2019-09-12 | End: 2019-09-12

## 2019-09-12 RX ADMIN — LEVOTHYROXINE SODIUM 75 MCG: 75 TABLET ORAL at 06:14

## 2019-09-12 RX ADMIN — WARFARIN SODIUM 4 MG: 4 TABLET ORAL at 13:26

## 2019-09-12 RX ADMIN — ASPIRIN 81 MG 81 MG: 81 TABLET ORAL at 09:16

## 2019-09-12 RX ADMIN — LISINOPRIL 20 MG: 20 TABLET ORAL at 20:48

## 2019-09-12 RX ADMIN — ALLOPURINOL 200 MG: 100 TABLET ORAL at 09:16

## 2019-09-12 RX ADMIN — IMIPRAMINE HYDROCHLORIDE 50 MG: 25 TABLET ORAL at 20:47

## 2019-09-12 RX ADMIN — CEFTRIAXONE 1 G: 1 INJECTION, POWDER, FOR SOLUTION INTRAMUSCULAR; INTRAVENOUS at 11:19

## 2019-09-12 RX ADMIN — DONEPEZIL HYDROCHLORIDE 5 MG: 5 TABLET, FILM COATED ORAL at 17:31

## 2019-09-12 RX ADMIN — DULOXETINE HYDROCHLORIDE 20 MG: 20 CAPSULE, DELAYED RELEASE ORAL at 09:16

## 2019-09-12 RX ADMIN — METOPROLOL TARTRATE 50 MG: 50 TABLET ORAL at 20:48

## 2019-09-12 RX ADMIN — ROSUVASTATIN CALCIUM 5 MG: 5 TABLET, COATED ORAL at 20:48

## 2019-09-12 RX ADMIN — LATANOPROST 1 DROP: 50 SOLUTION/ DROPS OPHTHALMIC at 20:48

## 2019-09-12 RX ADMIN — TIMOLOL MALEATE 1 DROP: 5 SOLUTION/ DROPS OPHTHALMIC at 09:18

## 2019-09-12 RX ADMIN — FLUDROCORTISONE ACETATE 0.1 MG: 0.1 TABLET ORAL at 09:16

## 2019-09-12 RX ADMIN — PENTOXIFYLLINE 400 MG: 400 TABLET, EXTENDED RELEASE ORAL at 20:48

## 2019-09-12 RX ADMIN — LISINOPRIL 20 MG: 20 TABLET ORAL at 09:16

## 2019-09-12 RX ADMIN — SODIUM CHLORIDE 500 ML: 9 INJECTION, SOLUTION INTRAVENOUS at 11:50

## 2019-09-12 RX ADMIN — METOPROLOL TARTRATE 50 MG: 50 TABLET ORAL at 09:16

## 2019-09-12 RX ADMIN — PENTOXIFYLLINE 400 MG: 400 TABLET, EXTENDED RELEASE ORAL at 09:16

## 2019-09-12 ASSESSMENT — PAIN SCALES - GENERAL
PAINLEVEL_OUTOF10: 0
PAINLEVEL_OUTOF10: 0

## 2019-09-12 NOTE — PROGRESS NOTES
[facility] Hospitalist Progress Note     Admit Date: 9/9/2019      Hospital Day: 4      Subjective:   Pt seen and examined. He came in to ED on 9/10 for acute progressive confusion for the past several days. His confusion is much better per his daughter. He has poor insight in his conditions, and history taking from him was not possible. General ROS: No fever, chills. Cardiovascular ROS: no chest pain. Respiratory ROS: no cough, shortness of breath. Gastrointestinal ROS: no abdominal pain, diarrhea, N/V. Genitourinary ROS:  Denies flank pain, suprapubic pain, dysuria, frequency. Objective:   /60   Pulse 58   Temp 97.4 °F (36.3 °C) (Axillary)   Resp 17   Ht 5' 11\" (1.803 m)   Wt 164 lb (74.4 kg)   SpO2 94%   BMI 22.87 kg/m²    General: The patient appears as stated age. Frail, weak, not in distress. Laying in bed calmly. Mental status: Awake, disoriented, mumbling. Communicable with simple languages. Eyes: RYLAN. Normal conjunctiva. ENT/Mouth: normal appearing jaw and neck, no neck nodes or sinus tenderness. Cardiovascular:  normal rate, regular rhythm, normal S1, S2, no murmurs, rubs, clicks or gallops. No peripheral edema. Dorsal pedis pulses 2+ bilaterally. Respiratory: clear to auscultation, no wheezes, rales or rhonchi, symmetric air entry. Gastrointestinal: soft, nontender, nondistended, no masses or organomegaly. Genitourinary:  No CVA/suprapubic tenderness. No bladder distention. Musculoskletal:  no clubbing or cyanosis. No joint swelling, warmth, or tenderness. Skin:  normal coloration and turgor, no rashes, no suspicious skin lesions noted. Neurologic: Mumbled speech, no focal findings or motor disorder noted. Data Review  Labs were reviewed.     Lab Results   Component Value Date    WBC 8.7 09/11/2019    HGB 12.7 (L) 09/11/2019    HCT 40.5 (L) 09/11/2019    MCV 96.4 09/11/2019     09/11/2019     Lab Results   Component Value Date

## 2019-09-12 NOTE — PROGRESS NOTES
Occupational Therapy  [facility] OCCUPATIONAL THERAPY EVALUATION    History  [place]:  The primary encounter diagnosis was Chronic kidney disease, unspecified CKD stage. Diagnoses of Altered mental status, unspecified altered mental status type and Acute cystitis without hematuria were also pertinent to this visit. Restrictions:  Restrictions/Precautions  Restrictions/Precautions: Fall Risk                        Communication with other providers:RN, PT  Subjective:  Patient states:  Pleasant, makes confused statement. Pain:  none  Patient goal:  DORIS    Occupational profile (relevant social history and personal factors):    Social/Functional History  Type of Home: Assisted living  Additional Comments: Predict Pt has assistance with most ADLs. Pt unable to confirm wether he walks more than room distances. He confirms he has a walker and a w/c. Examination of body systems (includes body structures/functions, activity/participation limitations):  · Orientation: O x self only. · Cognition:  DEmentia. Follows basic one step commands with frequent need for repetition or added visual or tactile cues. · Observation:  Received pt in bed. Alert. Rigid and bradykinesic movement . No charted or confirmed hx of Parkinson's. Reduced verbal output. · Vision:  glasses  · Hearing:  Hard of hearing. · ROM:  Not tested formally. Observed WFL up to 90* shldr flexion for head and facial reaching. · Strength: observed WFL in ADL but predict functional weakness with higher level activities beyond toileting and hand hygiene which completed during session  · Sensation: not tested  · Coordination: bradykinesia of BUE    ADLs  Feeding: Setup/SBA    Grooming: CGA to steady + moderate VCs for hand hygiene. Dressing: UB predict Ran in seated LB ModA (for brief threading after pt stated \"I can't reach\" after a feeble attempt while seated on commode). Pt was able to hike up from knee c light steadying assistance .      Bathing: UB increased functional independence. Pt will perform UB ADLs with SBA to increase functional independence. Pt will perform LB ADLs with Ran to increase functional independence. Pt will perform all aspects of toileting with SBA to increase functional independence. Pt will participate in therex/therax c emphasis on strength, activity tolerance,  safety, KATHY tasks. Plan:  Plan  Times per week: 2x      Recommendation for activity with nursing staff:  up to chair for meals  bedside commode   Rolling walker and cues/instruction    Treatment today:    Self Care Training:   Self care training was performed today. Cues were given for safety, sequence, UE/LE placement, visual cues, and balance. Activities performed today included dressing, toileting, hand hygiene, and grooming. Education: Role of OT, OT POC, d/c needs, home safety    Safety: Left in chair with all needs in reach. chair alarm applied. Gait belt used for transfer and mobility. Time in:  0904  Time out:  0930  Timed treatment minutes:  15  Total treatment time:  26    Electronically signed by:    [id number], OTR/L, [place]   [id number]   10:09 AM, 9/12/2019

## 2019-09-12 NOTE — CARE COORDINATION
CM into see pt and he is having periods of confusion. CM spoke with dtr who is shared POA. Leighheathakosua Barahona [phone]. CM discussed discharge plans for pt. CM was informed by dtr that they visited [facility] for placement and would like to go there. CM received permission  to call [facility] with PHI   CM called Christofer at [facility] to discuss. CM faxed PT/OT, H/P and face sheet with 3 day MAR record. CM informed that they will review records and call CM with response. CM efaxed the face sheet.  [address]

## 2019-09-13 VITALS
TEMPERATURE: 97.3 F | WEIGHT: 167.7 LBS | DIASTOLIC BLOOD PRESSURE: 61 MMHG | SYSTOLIC BLOOD PRESSURE: 122 MMHG | HEART RATE: 61 BPM | RESPIRATION RATE: 16 BRPM | BODY MASS INDEX: 23.48 KG/M2 | HEIGHT: 71 IN | OXYGEN SATURATION: 91 %

## 2019-09-13 LAB
ANION GAP SERPL CALCULATED.3IONS-SCNC: 14 MMOL/L (ref 4–16)
APTT: 29.2 SECONDS (ref 21.2–33)
BUN BLDV-MCNC: 38 MG/DL (ref 6–23)
CALCIUM SERPL-MCNC: 9.3 MG/DL (ref 8.3–10.6)
CHLORIDE BLD-SCNC: 103 MMOL/L (ref 99–110)
CO2: 21 MMOL/L (ref 21–32)
CREAT SERPL-MCNC: 2.5 MG/DL (ref 0.9–1.3)
CULTURE: ABNORMAL
CULTURE: ABNORMAL
GFR AFRICAN AMERICAN: 29 ML/MIN/1.73M2
GFR NON-AFRICAN AMERICAN: 24 ML/MIN/1.73M2
GLUCOSE BLD-MCNC: 102 MG/DL (ref 70–99)
INR BLD: 1.79 INDEX
Lab: ABNORMAL
POTASSIUM SERPL-SCNC: 4.5 MMOL/L (ref 3.5–5.1)
PROTHROMBIN TIME: 20.5 SECONDS (ref 9.12–12.5)
SODIUM BLD-SCNC: 138 MMOL/L (ref 135–145)
SPECIMEN: ABNORMAL
TOTAL COLONY COUNT: ABNORMAL

## 2019-09-13 PROCEDURE — 85610 PROTHROMBIN TIME: CPT

## 2019-09-13 PROCEDURE — 97116 GAIT TRAINING THERAPY: CPT

## 2019-09-13 PROCEDURE — 97112 NEUROMUSCULAR REEDUCATION: CPT

## 2019-09-13 PROCEDURE — 36415 COLL VENOUS BLD VENIPUNCTURE: CPT

## 2019-09-13 PROCEDURE — 6370000000 HC RX 637 (ALT 250 FOR IP): Performed by: INTERNAL MEDICINE

## 2019-09-13 PROCEDURE — 97110 THERAPEUTIC EXERCISES: CPT

## 2019-09-13 PROCEDURE — 97530 THERAPEUTIC ACTIVITIES: CPT

## 2019-09-13 PROCEDURE — 80048 BASIC METABOLIC PNL TOTAL CA: CPT

## 2019-09-13 PROCEDURE — 85730 THROMBOPLASTIN TIME PARTIAL: CPT

## 2019-09-13 PROCEDURE — 6360000002 HC RX W HCPCS: Performed by: INTERNAL MEDICINE

## 2019-09-13 PROCEDURE — 2580000003 HC RX 258: Performed by: INTERNAL MEDICINE

## 2019-09-13 RX ORDER — CIPROFLOXACIN 250 MG/1
250 TABLET, FILM COATED ORAL EVERY 12 HOURS SCHEDULED
Qty: 8 TABLET | Refills: 0 | DISCHARGE
Start: 2019-09-14 | End: 2019-09-18

## 2019-09-13 RX ORDER — CIPROFLOXACIN 250 MG/1
250 TABLET, FILM COATED ORAL EVERY 12 HOURS SCHEDULED
Status: DISCONTINUED | OUTPATIENT
Start: 2019-09-14 | End: 2019-09-13 | Stop reason: HOSPADM

## 2019-09-13 RX ORDER — DONEPEZIL HYDROCHLORIDE 5 MG/1
5 TABLET, FILM COATED ORAL NIGHTLY
Qty: 30 TABLET | Refills: 3 | DISCHARGE
Start: 2019-09-13

## 2019-09-13 RX ORDER — WARFARIN SODIUM 4 MG/1
4 TABLET ORAL
Status: COMPLETED | OUTPATIENT
Start: 2019-09-13 | End: 2019-09-13

## 2019-09-13 RX ORDER — ALLOPURINOL 100 MG/1
200 TABLET ORAL DAILY
Qty: 10 TABLET | Refills: 0 | DISCHARGE
Start: 2019-09-13

## 2019-09-13 RX ORDER — CIPROFLOXACIN 250 MG/1
250 TABLET, FILM COATED ORAL EVERY 12 HOURS SCHEDULED
Status: DISCONTINUED | OUTPATIENT
Start: 2019-09-13 | End: 2019-09-13

## 2019-09-13 RX ADMIN — METOPROLOL TARTRATE 50 MG: 50 TABLET ORAL at 08:52

## 2019-09-13 RX ADMIN — PENTOXIFYLLINE 400 MG: 400 TABLET, EXTENDED RELEASE ORAL at 08:52

## 2019-09-13 RX ADMIN — DULOXETINE HYDROCHLORIDE 20 MG: 20 CAPSULE, DELAYED RELEASE ORAL at 08:53

## 2019-09-13 RX ADMIN — LEVOTHYROXINE SODIUM 75 MCG: 75 TABLET ORAL at 05:11

## 2019-09-13 RX ADMIN — FLUDROCORTISONE ACETATE 0.1 MG: 0.1 TABLET ORAL at 08:52

## 2019-09-13 RX ADMIN — ALLOPURINOL 200 MG: 100 TABLET ORAL at 08:52

## 2019-09-13 RX ADMIN — ASPIRIN 81 MG 81 MG: 81 TABLET ORAL at 08:52

## 2019-09-13 RX ADMIN — TIMOLOL MALEATE 1 DROP: 5 SOLUTION/ DROPS OPHTHALMIC at 08:54

## 2019-09-13 RX ADMIN — LISINOPRIL 20 MG: 20 TABLET ORAL at 08:52

## 2019-09-13 RX ADMIN — CEFTRIAXONE 1 G: 1 INJECTION, POWDER, FOR SOLUTION INTRAMUSCULAR; INTRAVENOUS at 11:50

## 2019-09-13 RX ADMIN — WARFARIN SODIUM 4 MG: 4 TABLET ORAL at 11:50

## 2019-09-13 NOTE — PROGRESS NOTES
tactile. Pt SBA seated + visual cues, one tactile cue for initiation / technique to perform BUE AROM exercises x10 reps each to include: shoulder flex/extension, internal/external rotation, chest presses, bicep curls, rowing, and supination/pronation. All therapeutic intervention performed c emphasis on dynamic balance / standing tolerance to inc strength, endurance and act tolerance for inc Indep c ADL tasks, func transfers / mobility. Safety  Patient safely in bedside chair + alarm at end of session, with call light/phone in reach, and nursing aware. Gait belt was used for func transfers / mobility. Floor mat replaced. Assessment / Impression:        Patient's tolerance of treatment: Fair   Adverse Reaction: None  Significant change in status and impact:  None  Barriers to improvement:  deconditioning, Parkinson's-like deconditioning, postural deficits    Plan for Next Session:    Continue per OT POC. Time in:  1025  Time out:  1103  Timed treatment minutes:  38  Total treatment time:  38    Electronically signed by:    FABIENNE Bond  9/13/2019, 10:27 AM    Previously filed values:    Goals:  By d/c or goals met:  Pt will perform all bed mobility with CGA HOB flat in prep for EOB/OOB activity. Pt will perform all functional transfers with CGA and appropriate use of LRD to bed, toilet, chair in prep for increased functional independence. Pt will perform UB ADLs with SBA to increase functional independence. Pt will perform LB ADLs with Ran to increase functional independence. Pt will perform all aspects of toileting with SBA to increase functional independence. Pt will participate in therex/therax c emphasis on strength, activity tolerance,  safety, KATHY tasks.

## 2019-09-13 NOTE — PROGRESS NOTES
Attempted to call report to [facility] with no answer. Melisa Ibarra will be here at 5:15 to  pt. No issues at this time.

## 2019-09-13 NOTE — CARE COORDINATION
[facility]. Notified MICHELLE Cope of same.   Maria Del Carmen Peters

## 2019-09-14 NOTE — DISCHARGE SUMMARY
distress. Laying in bed calmly. Mental status: Awake, disoriented, mild mumbling. Communicable. Eyes: RYLAN. Normal conjunctiva. ENT/Mouth: normal appearing jaw and neck, no neck nodes or sinus tenderness. Cardiovascular:  normal rate, regular rhythm, normal S1, S2, no murmurs, rubs, clicks or gallops. No peripheral edema. Dorsal pedis pulses 2+ bilaterally. Respiratory: clear to auscultation, no wheezes, rales or rhonchi, symmetric air entry. Gastrointestinal: soft, nontender, nondistended, no masses or organomegaly. Genitourinary:  No CVA/suprapubic tenderness. No bladder distention. Musculoskletal:  no clubbing or cyanosis. No joint swelling, warmth, or tenderness. Skin:  normal coloration and turgor, no rashes, no suspicious skin lesions noted. Neurologic: Mildly mumbled speech, no focal findings or motor disorder noted.     Discharge Medications:       [facility] Medication Instructions PSD:[id number]    Printed on:09/13/19 0470   Medication Information                      acetaminophen (TYLENOL) 500 MG tablet  Take 500 mg by mouth every 6 hours as needed for Pain              allopurinol (ZYLOPRIM) 100 MG tablet  Take 2 tablets by mouth daily             aspirin 81 MG chewable tablet  Take 1 tablet by mouth daily             ciprofloxacin (CIPRO) 250 MG tablet  Take 1 tablet by mouth every 12 hours for 4 days             cloNIDine (CATAPRES) 0.3 MG tablet  Take 0.3 mg by mouth daily Take only if systolic [id number]             donepezil (ARICEPT) 5 MG tablet  Take 1 tablet by mouth nightly             DULoxetine (CYMBALTA) 20 MG extended release capsule  Take 20 mg by mouth daily             fludrocortisone (FLORINEF) 0.1 MG tablet  Take 0.1 mg by mouth daily             fluticasone (FLONASE) 50 MCG/ACT nasal spray  2 sprays by Nasal route daily as needed              imipramine (TOFRANIL) 50 MG tablet  Take 50 mg by mouth nightly             ketoconazole (NIZORAL) 2 % Julián Silveira MD on 9/13/2019 at 10:03 PM

## 2019-09-16 LAB
EKG ATRIAL RATE: 70 BPM
EKG DIAGNOSIS: NORMAL
EKG P AXIS: 84 DEGREES
EKG P-R INTERVAL: 226 MS
EKG Q-T INTERVAL: 406 MS
EKG QRS DURATION: 102 MS
EKG QTC CALCULATION (BAZETT): 438 MS
EKG R AXIS: 4 DEGREES
EKG T AXIS: 59 DEGREES
EKG VENTRICULAR RATE: 70 BPM

## 2019-10-10 PROBLEM — N39.0 UTI (URINARY TRACT INFECTION): Status: RESOLVED | Noted: 2019-09-10 | Resolved: 2019-10-10

## 2019-10-23 ENCOUNTER — HOSPITAL ENCOUNTER (INPATIENT)
Age: 84
LOS: 5 days | Discharge: NURSING, REHAB OR LONG-TERM CARE | DRG: 682 | End: 2019-10-29
Attending: EMERGENCY MEDICINE | Admitting: INTERNAL MEDICINE
Payer: MEDICARE

## 2019-10-23 DIAGNOSIS — N17.9 AKI (ACUTE KIDNEY INJURY) (HCC): Primary | ICD-10-CM

## 2019-10-23 DIAGNOSIS — K92.2 LOWER GI BLEED: ICD-10-CM

## 2019-10-23 LAB
ABO/RH: NORMAL
ALBUMIN SERPL-MCNC: 3.9 GM/DL (ref 3.4–5)
ALP BLD-CCNC: 108 IU/L (ref 40–129)
ALT SERPL-CCNC: 14 U/L (ref 10–40)
ANION GAP SERPL CALCULATED.3IONS-SCNC: 12 MMOL/L (ref 4–16)
ANTIBODY SCREEN: NEGATIVE
APTT: 30.8 SECONDS (ref 25.1–37.1)
AST SERPL-CCNC: 18 IU/L (ref 15–37)
BASOPHILS ABSOLUTE: 0.1 K/CU MM
BASOPHILS RELATIVE PERCENT: 1.4 % (ref 0–1)
BILIRUB SERPL-MCNC: 0.2 MG/DL (ref 0–1)
BUN BLDV-MCNC: 63 MG/DL (ref 6–23)
CALCIUM SERPL-MCNC: 10 MG/DL (ref 8.3–10.6)
CHLORIDE BLD-SCNC: 104 MMOL/L (ref 99–110)
CO2: 22 MMOL/L (ref 21–32)
CREAT SERPL-MCNC: 4.6 MG/DL (ref 0.9–1.3)
DIFFERENTIAL TYPE: ABNORMAL
EOSINOPHILS ABSOLUTE: 0.5 K/CU MM
EOSINOPHILS RELATIVE PERCENT: 5.5 % (ref 0–3)
GFR AFRICAN AMERICAN: 15 ML/MIN/1.73M2
GFR NON-AFRICAN AMERICAN: 12 ML/MIN/1.73M2
GLUCOSE BLD-MCNC: 93 MG/DL (ref 70–99)
HCT VFR BLD CALC: 47.4 % (ref 42–52)
HEMOGLOBIN: 14.7 GM/DL (ref 13.5–18)
IMMATURE NEUTROPHIL %: 0.2 % (ref 0–0.43)
INR BLD: 1.98 INDEX
LYMPHOCYTES ABSOLUTE: 1.5 K/CU MM
LYMPHOCYTES RELATIVE PERCENT: 15 % (ref 24–44)
MCH RBC QN AUTO: 30.2 PG (ref 27–31)
MCHC RBC AUTO-ENTMCNC: 31 % (ref 32–36)
MCV RBC AUTO: 97.3 FL (ref 78–100)
MONOCYTES ABSOLUTE: 0.7 K/CU MM
MONOCYTES RELATIVE PERCENT: 7 % (ref 0–4)
NUCLEATED RBC %: 0 %
PDW BLD-RTO: 14.1 % (ref 11.7–14.9)
PLATELET # BLD: 363 K/CU MM (ref 140–440)
PMV BLD AUTO: 10.6 FL (ref 7.5–11.1)
POTASSIUM SERPL-SCNC: 5.1 MMOL/L (ref 3.5–5.1)
PROTHROMBIN TIME: 22.7 SECONDS (ref 11.7–14.5)
RBC # BLD: 4.87 M/CU MM (ref 4.6–6.2)
SEGMENTED NEUTROPHILS ABSOLUTE COUNT: 6.9 K/CU MM
SEGMENTED NEUTROPHILS RELATIVE PERCENT: 70.9 % (ref 36–66)
SODIUM BLD-SCNC: 138 MMOL/L (ref 135–145)
TOTAL IMMATURE NEUTOROPHIL: 0.02 K/CU MM
TOTAL NUCLEATED RBC: 0 K/CU MM
TOTAL PROTEIN: 7.4 GM/DL (ref 6.4–8.2)
WBC # BLD: 9.7 K/CU MM (ref 4–10.5)

## 2019-10-23 PROCEDURE — 85730 THROMBOPLASTIN TIME PARTIAL: CPT

## 2019-10-23 PROCEDURE — 85610 PROTHROMBIN TIME: CPT

## 2019-10-23 PROCEDURE — 85025 COMPLETE CBC W/AUTO DIFF WBC: CPT

## 2019-10-23 PROCEDURE — 86901 BLOOD TYPING SEROLOGIC RH(D): CPT

## 2019-10-23 PROCEDURE — 86850 RBC ANTIBODY SCREEN: CPT

## 2019-10-23 PROCEDURE — 99284 EMERGENCY DEPT VISIT MOD MDM: CPT

## 2019-10-23 PROCEDURE — 86900 BLOOD TYPING SEROLOGIC ABO: CPT

## 2019-10-23 PROCEDURE — 36415 COLL VENOUS BLD VENIPUNCTURE: CPT

## 2019-10-23 PROCEDURE — 80053 COMPREHEN METABOLIC PANEL: CPT

## 2019-10-23 ASSESSMENT — ENCOUNTER SYMPTOMS
RHINORRHEA: 0
COUGH: 0
NAUSEA: 0
WHEEZING: 0
SHORTNESS OF BREATH: 0
BLOOD IN STOOL: 1
VOMITING: 0
CONSTIPATION: 0
SINUS PRESSURE: 0
ABDOMINAL PAIN: 0
SORE THROAT: 0
DIARRHEA: 0

## 2019-10-24 PROBLEM — N18.9 ACUTE KIDNEY INJURY SUPERIMPOSED ON CHRONIC KIDNEY DISEASE (HCC): Status: ACTIVE | Noted: 2019-10-24

## 2019-10-24 PROBLEM — N17.9 ACUTE KIDNEY INJURY SUPERIMPOSED ON CHRONIC KIDNEY DISEASE (HCC): Status: ACTIVE | Noted: 2019-10-24

## 2019-10-24 LAB
ALBUMIN SERPL-MCNC: 3.4 GM/DL (ref 3.4–5)
ANION GAP SERPL CALCULATED.3IONS-SCNC: 12 MMOL/L (ref 4–16)
APTT: 34.5 SECONDS (ref 25.1–37.1)
BUN BLDV-MCNC: 55 MG/DL (ref 6–23)
CALCIUM SERPL-MCNC: 8.8 MG/DL (ref 8.3–10.6)
CHLORIDE BLD-SCNC: 111 MMOL/L (ref 99–110)
CO2: 18 MMOL/L (ref 21–32)
CREAT SERPL-MCNC: 3.4 MG/DL (ref 0.9–1.3)
GFR AFRICAN AMERICAN: 21 ML/MIN/1.73M2
GFR NON-AFRICAN AMERICAN: 17 ML/MIN/1.73M2
GLUCOSE BLD-MCNC: 104 MG/DL (ref 70–99)
INR BLD: 2.37 INDEX
PHOSPHORUS: 3.4 MG/DL (ref 2.5–4.9)
POTASSIUM SERPL-SCNC: ABNORMAL MMOL/L (ref 3.5–5.1)
PROTHROMBIN TIME: 27.2 SECONDS (ref 11.7–14.5)
SODIUM BLD-SCNC: 141 MMOL/L (ref 135–145)

## 2019-10-24 PROCEDURE — 2580000003 HC RX 258: Performed by: INTERNAL MEDICINE

## 2019-10-24 PROCEDURE — 85610 PROTHROMBIN TIME: CPT

## 2019-10-24 PROCEDURE — G0328 FECAL BLOOD SCRN IMMUNOASSAY: HCPCS

## 2019-10-24 PROCEDURE — 94761 N-INVAS EAR/PLS OXIMETRY MLT: CPT

## 2019-10-24 PROCEDURE — 6360000002 HC RX W HCPCS: Performed by: NURSE PRACTITIONER

## 2019-10-24 PROCEDURE — 6370000000 HC RX 637 (ALT 250 FOR IP): Performed by: INTERNAL MEDICINE

## 2019-10-24 PROCEDURE — 2500000003 HC RX 250 WO HCPCS: Performed by: NURSE PRACTITIONER

## 2019-10-24 PROCEDURE — 2580000003 HC RX 258: Performed by: NURSE PRACTITIONER

## 2019-10-24 PROCEDURE — 2580000003 HC RX 258: Performed by: EMERGENCY MEDICINE

## 2019-10-24 PROCEDURE — 85730 THROMBOPLASTIN TIME PARTIAL: CPT

## 2019-10-24 PROCEDURE — 80069 RENAL FUNCTION PANEL: CPT

## 2019-10-24 PROCEDURE — 6370000000 HC RX 637 (ALT 250 FOR IP): Performed by: NURSE PRACTITIONER

## 2019-10-24 PROCEDURE — 1200000000 HC SEMI PRIVATE

## 2019-10-24 PROCEDURE — 36415 COLL VENOUS BLD VENIPUNCTURE: CPT

## 2019-10-24 RX ORDER — DULOXETIN HYDROCHLORIDE 20 MG/1
20 CAPSULE, DELAYED RELEASE ORAL DAILY
Status: DISCONTINUED | OUTPATIENT
Start: 2019-10-24 | End: 2019-10-29 | Stop reason: HOSPADM

## 2019-10-24 RX ORDER — FAMOTIDINE 20 MG/1
20 TABLET, FILM COATED ORAL DAILY
Status: DISCONTINUED | OUTPATIENT
Start: 2019-10-24 | End: 2019-10-29 | Stop reason: HOSPADM

## 2019-10-24 RX ORDER — METOPROLOL TARTRATE 50 MG/1
50 TABLET, FILM COATED ORAL 2 TIMES DAILY
Status: DISCONTINUED | OUTPATIENT
Start: 2019-10-24 | End: 2019-10-28

## 2019-10-24 RX ORDER — DEXTROSE MONOHYDRATE 25 G/50ML
12.5 INJECTION, SOLUTION INTRAVENOUS PRN
Status: DISCONTINUED | OUTPATIENT
Start: 2019-10-24 | End: 2019-10-29 | Stop reason: HOSPADM

## 2019-10-24 RX ORDER — SODIUM POLYSTYRENE SULFONATE 15 G/60ML
15 SUSPENSION ORAL; RECTAL ONCE
Status: DISCONTINUED | OUTPATIENT
Start: 2019-10-24 | End: 2019-10-24

## 2019-10-24 RX ORDER — CLONIDINE HYDROCHLORIDE 0.3 MG/1
0.3 TABLET ORAL DAILY PRN
Status: DISCONTINUED | OUTPATIENT
Start: 2019-10-24 | End: 2019-10-29 | Stop reason: HOSPADM

## 2019-10-24 RX ORDER — LEVOTHYROXINE SODIUM 0.07 MG/1
75 TABLET ORAL DAILY
Status: DISCONTINUED | OUTPATIENT
Start: 2019-10-24 | End: 2019-10-29 | Stop reason: HOSPADM

## 2019-10-24 RX ORDER — LORAZEPAM 2 MG/ML
0.5 INJECTION INTRAMUSCULAR ONCE
Status: COMPLETED | OUTPATIENT
Start: 2019-10-24 | End: 2019-10-24

## 2019-10-24 RX ORDER — SODIUM CHLORIDE 0.9 % (FLUSH) 0.9 %
10 SYRINGE (ML) INJECTION EVERY 12 HOURS SCHEDULED
Status: DISCONTINUED | OUTPATIENT
Start: 2019-10-24 | End: 2019-10-29 | Stop reason: HOSPADM

## 2019-10-24 RX ORDER — SODIUM CHLORIDE 9 MG/ML
INJECTION, SOLUTION INTRAVENOUS CONTINUOUS
Status: DISCONTINUED | OUTPATIENT
Start: 2019-10-24 | End: 2019-10-28

## 2019-10-24 RX ORDER — ONDANSETRON 2 MG/ML
4 INJECTION INTRAMUSCULAR; INTRAVENOUS EVERY 6 HOURS PRN
Status: DISCONTINUED | OUTPATIENT
Start: 2019-10-24 | End: 2019-10-29 | Stop reason: HOSPADM

## 2019-10-24 RX ORDER — DEXTROSE MONOHYDRATE 25 G/50ML
25 INJECTION, SOLUTION INTRAVENOUS ONCE
Status: COMPLETED | OUTPATIENT
Start: 2019-10-24 | End: 2019-10-24

## 2019-10-24 RX ORDER — PENTOXIFYLLINE 400 MG/1
400 TABLET, EXTENDED RELEASE ORAL 2 TIMES DAILY
Status: DISCONTINUED | OUTPATIENT
Start: 2019-10-24 | End: 2019-10-29 | Stop reason: HOSPADM

## 2019-10-24 RX ORDER — TIMOLOL MALEATE 5 MG/ML
1 SOLUTION/ DROPS OPHTHALMIC DAILY
Status: DISCONTINUED | OUTPATIENT
Start: 2019-10-24 | End: 2019-10-29 | Stop reason: HOSPADM

## 2019-10-24 RX ORDER — HALOPERIDOL 5 MG/ML
5 INJECTION INTRAMUSCULAR ONCE
Status: DISCONTINUED | OUTPATIENT
Start: 2019-10-25 | End: 2019-10-25

## 2019-10-24 RX ORDER — ROSUVASTATIN CALCIUM 5 MG/1
5 TABLET, COATED ORAL NIGHTLY
Status: DISCONTINUED | OUTPATIENT
Start: 2019-10-24 | End: 2019-10-29 | Stop reason: HOSPADM

## 2019-10-24 RX ORDER — ALLOPURINOL 100 MG/1
100 TABLET ORAL DAILY
Status: DISCONTINUED | OUTPATIENT
Start: 2019-10-24 | End: 2019-10-29 | Stop reason: HOSPADM

## 2019-10-24 RX ORDER — CALCIUM GLUCONATE 94 MG/ML
1 INJECTION, SOLUTION INTRAVENOUS ONCE
Status: DISCONTINUED | OUTPATIENT
Start: 2019-10-24 | End: 2019-10-24

## 2019-10-24 RX ORDER — ACETAMINOPHEN 325 MG/1
650 TABLET ORAL EVERY 4 HOURS PRN
Status: DISCONTINUED | OUTPATIENT
Start: 2019-10-24 | End: 2019-10-29 | Stop reason: HOSPADM

## 2019-10-24 RX ORDER — BENZTROPINE MESYLATE 1 MG/ML
1 INJECTION INTRAMUSCULAR; INTRAVENOUS ONCE
Status: DISCONTINUED | OUTPATIENT
Start: 2019-10-25 | End: 2019-10-25

## 2019-10-24 RX ORDER — LATANOPROST 50 UG/ML
1 SOLUTION/ DROPS OPHTHALMIC NIGHTLY
Status: DISCONTINUED | OUTPATIENT
Start: 2019-10-24 | End: 2019-10-29 | Stop reason: HOSPADM

## 2019-10-24 RX ORDER — FLUDROCORTISONE ACETATE 0.1 MG/1
0.1 TABLET ORAL DAILY
Status: DISCONTINUED | OUTPATIENT
Start: 2019-10-24 | End: 2019-10-29 | Stop reason: HOSPADM

## 2019-10-24 RX ORDER — FLUTICASONE PROPIONATE 50 MCG
2 SPRAY, SUSPENSION (ML) NASAL DAILY PRN
Status: DISCONTINUED | OUTPATIENT
Start: 2019-10-24 | End: 2019-10-29 | Stop reason: HOSPADM

## 2019-10-24 RX ORDER — ASPIRIN 81 MG/1
81 TABLET, CHEWABLE ORAL DAILY
Status: DISCONTINUED | OUTPATIENT
Start: 2019-10-24 | End: 2019-10-29 | Stop reason: HOSPADM

## 2019-10-24 RX ORDER — DEXTROSE MONOHYDRATE 50 MG/ML
100 INJECTION, SOLUTION INTRAVENOUS PRN
Status: DISCONTINUED | OUTPATIENT
Start: 2019-10-24 | End: 2019-10-29 | Stop reason: HOSPADM

## 2019-10-24 RX ORDER — DONEPEZIL HYDROCHLORIDE 5 MG/1
5 TABLET, FILM COATED ORAL NIGHTLY
Status: DISCONTINUED | OUTPATIENT
Start: 2019-10-24 | End: 2019-10-25

## 2019-10-24 RX ORDER — SODIUM CHLORIDE 0.9 % (FLUSH) 0.9 %
10 SYRINGE (ML) INJECTION PRN
Status: DISCONTINUED | OUTPATIENT
Start: 2019-10-24 | End: 2019-10-29 | Stop reason: HOSPADM

## 2019-10-24 RX ORDER — SODIUM CHLORIDE 9 MG/ML
INJECTION, SOLUTION INTRAVENOUS CONTINUOUS
Status: DISCONTINUED | OUTPATIENT
Start: 2019-10-24 | End: 2019-10-24

## 2019-10-24 RX ORDER — NICOTINE POLACRILEX 4 MG
15 LOZENGE BUCCAL PRN
Status: DISCONTINUED | OUTPATIENT
Start: 2019-10-24 | End: 2019-10-29 | Stop reason: HOSPADM

## 2019-10-24 RX ORDER — IMIPRAMINE HCL 25 MG
50 TABLET ORAL NIGHTLY
Status: DISCONTINUED | OUTPATIENT
Start: 2019-10-24 | End: 2019-10-29 | Stop reason: HOSPADM

## 2019-10-24 RX ADMIN — Medication 10 ML: at 22:29

## 2019-10-24 RX ADMIN — DULOXETINE HYDROCHLORIDE 20 MG: 20 CAPSULE, DELAYED RELEASE ORAL at 10:28

## 2019-10-24 RX ADMIN — SODIUM CHLORIDE: 9 INJECTION, SOLUTION INTRAVENOUS at 22:28

## 2019-10-24 RX ADMIN — LORAZEPAM 0.5 MG: 2 INJECTION, SOLUTION INTRAMUSCULAR; INTRAVENOUS at 23:21

## 2019-10-24 RX ADMIN — SODIUM CHLORIDE: 9 INJECTION, SOLUTION INTRAVENOUS at 04:10

## 2019-10-24 RX ADMIN — LEVOTHYROXINE SODIUM 75 MCG: 75 TABLET ORAL at 05:57

## 2019-10-24 RX ADMIN — DEXTROSE 50 % IN WATER (D50W) INTRAVENOUS SYRINGE 25 G: at 23:51

## 2019-10-24 RX ADMIN — SODIUM CHLORIDE: 9 INJECTION, SOLUTION INTRAVENOUS at 14:26

## 2019-10-24 RX ADMIN — PENTOXIFYLLINE 400 MG: 400 TABLET, EXTENDED RELEASE ORAL at 10:28

## 2019-10-24 RX ADMIN — SODIUM CHLORIDE: 9 INJECTION, SOLUTION INTRAVENOUS at 00:55

## 2019-10-24 RX ADMIN — ALLOPURINOL 100 MG: 100 TABLET ORAL at 10:29

## 2019-10-24 RX ADMIN — FLUDROCORTISONE ACETATE 0.1 MG: 0.1 TABLET ORAL at 10:27

## 2019-10-24 RX ADMIN — SODIUM CHLORIDE: 9 INJECTION, SOLUTION INTRAVENOUS at 03:29

## 2019-10-24 RX ADMIN — Medication 50 MEQ: at 23:50

## 2019-10-24 RX ADMIN — CALCIUM GLUCONATE 1 G: 98 INJECTION, SOLUTION INTRAVENOUS at 23:51

## 2019-10-24 RX ADMIN — FAMOTIDINE 20 MG: 20 TABLET ORAL at 10:28

## 2019-10-24 RX ADMIN — INSULIN HUMAN 10 UNITS: 100 INJECTION, SOLUTION PARENTERAL at 23:50

## 2019-10-24 RX ADMIN — METOPROLOL TARTRATE 50 MG: 50 TABLET, FILM COATED ORAL at 10:28

## 2019-10-24 RX ADMIN — ASPIRIN 81 MG 81 MG: 81 TABLET ORAL at 10:28

## 2019-10-24 RX ADMIN — TIMOLOL MALEATE 1 DROP: 5 SOLUTION OPHTHALMIC at 12:48

## 2019-10-24 ASSESSMENT — PAIN SCALES - GENERAL
PAINLEVEL_OUTOF10: 0

## 2019-10-25 LAB
ANION GAP SERPL CALCULATED.3IONS-SCNC: 10 MMOL/L (ref 4–16)
BASOPHILS ABSOLUTE: 0.2 K/CU MM
BASOPHILS RELATIVE PERCENT: 1.8 % (ref 0–1)
BUN BLDV-MCNC: 48 MG/DL (ref 6–23)
CALCIUM SERPL-MCNC: 9.1 MG/DL (ref 8.3–10.6)
CHLORIDE BLD-SCNC: 110 MMOL/L (ref 99–110)
CO2: 18 MMOL/L (ref 21–32)
CREAT SERPL-MCNC: 2.9 MG/DL (ref 0.9–1.3)
DIFFERENTIAL TYPE: ABNORMAL
EOSINOPHILS ABSOLUTE: 0.8 K/CU MM
EOSINOPHILS RELATIVE PERCENT: 8 % (ref 0–3)
GFR AFRICAN AMERICAN: 25 ML/MIN/1.73M2
GFR NON-AFRICAN AMERICAN: 21 ML/MIN/1.73M2
GLUCOSE BLD-MCNC: 101 MG/DL (ref 70–99)
GLUCOSE BLD-MCNC: 105 MG/DL (ref 70–99)
GLUCOSE BLD-MCNC: 95 MG/DL (ref 70–99)
GLUCOSE BLD-MCNC: 98 MG/DL (ref 70–99)
HCT VFR BLD CALC: 40.3 % (ref 42–52)
HEMOCCULT SP1 STL QL: NEGATIVE
HEMOGLOBIN: 12.3 GM/DL (ref 13.5–18)
IMMATURE NEUTROPHIL %: 0.3 % (ref 0–0.43)
LYMPHOCYTES ABSOLUTE: 2.1 K/CU MM
LYMPHOCYTES RELATIVE PERCENT: 21.2 % (ref 24–44)
MCH RBC QN AUTO: 30.2 PG (ref 27–31)
MCHC RBC AUTO-ENTMCNC: 30.5 % (ref 32–36)
MCV RBC AUTO: 99 FL (ref 78–100)
MONOCYTES ABSOLUTE: 0.9 K/CU MM
MONOCYTES RELATIVE PERCENT: 8.7 % (ref 0–4)
NUCLEATED RBC %: 0 %
PDW BLD-RTO: 14 % (ref 11.7–14.9)
PLATELET # BLD: 251 K/CU MM (ref 140–440)
PMV BLD AUTO: 10.6 FL (ref 7.5–11.1)
POTASSIUM SERPL-SCNC: ABNORMAL MMOL/L (ref 3.5–5.1)
RBC # BLD: 4.07 M/CU MM (ref 4.6–6.2)
SEGMENTED NEUTROPHILS ABSOLUTE COUNT: 5.9 K/CU MM
SEGMENTED NEUTROPHILS RELATIVE PERCENT: 60 % (ref 36–66)
SODIUM BLD-SCNC: 138 MMOL/L (ref 135–145)
TOTAL IMMATURE NEUTOROPHIL: 0.03 K/CU MM
TOTAL NUCLEATED RBC: 0 K/CU MM
WBC # BLD: 9.9 K/CU MM (ref 4–10.5)

## 2019-10-25 PROCEDURE — 6370000000 HC RX 637 (ALT 250 FOR IP): Performed by: INTERNAL MEDICINE

## 2019-10-25 PROCEDURE — 85025 COMPLETE CBC W/AUTO DIFF WBC: CPT

## 2019-10-25 PROCEDURE — 80048 BASIC METABOLIC PNL TOTAL CA: CPT

## 2019-10-25 PROCEDURE — 1200000000 HC SEMI PRIVATE

## 2019-10-25 PROCEDURE — 2580000003 HC RX 258: Performed by: INTERNAL MEDICINE

## 2019-10-25 PROCEDURE — 6370000000 HC RX 637 (ALT 250 FOR IP): Performed by: STUDENT IN AN ORGANIZED HEALTH CARE EDUCATION/TRAINING PROGRAM

## 2019-10-25 PROCEDURE — 82962 GLUCOSE BLOOD TEST: CPT

## 2019-10-25 PROCEDURE — 94761 N-INVAS EAR/PLS OXIMETRY MLT: CPT

## 2019-10-25 PROCEDURE — 6360000002 HC RX W HCPCS

## 2019-10-25 PROCEDURE — 36415 COLL VENOUS BLD VENIPUNCTURE: CPT

## 2019-10-25 RX ORDER — HALOPERIDOL 5 MG/ML
5 INJECTION INTRAMUSCULAR ONCE
Status: COMPLETED | OUTPATIENT
Start: 2019-10-25 | End: 2019-10-25

## 2019-10-25 RX ORDER — HALOPERIDOL 5 MG/ML
INJECTION INTRAMUSCULAR
Status: COMPLETED
Start: 2019-10-25 | End: 2019-10-25

## 2019-10-25 RX ORDER — BENZTROPINE MESYLATE 1 MG/ML
1 INJECTION INTRAMUSCULAR; INTRAVENOUS ONCE
Status: DISCONTINUED | OUTPATIENT
Start: 2019-10-25 | End: 2019-10-28

## 2019-10-25 RX ORDER — DONEPEZIL HYDROCHLORIDE 5 MG/1
5 TABLET, FILM COATED ORAL SEE ADMIN INSTRUCTIONS
Status: DISCONTINUED | OUTPATIENT
Start: 2019-10-25 | End: 2019-10-29 | Stop reason: HOSPADM

## 2019-10-25 RX ADMIN — HALOPERIDOL LACTATE 5 MG: 5 INJECTION, SOLUTION INTRAMUSCULAR at 00:18

## 2019-10-25 RX ADMIN — TIMOLOL MALEATE 1 DROP: 5 SOLUTION OPHTHALMIC at 11:44

## 2019-10-25 RX ADMIN — FAMOTIDINE 20 MG: 20 TABLET ORAL at 11:46

## 2019-10-25 RX ADMIN — PENTOXIFYLLINE 400 MG: 400 TABLET, EXTENDED RELEASE ORAL at 21:02

## 2019-10-25 RX ADMIN — PENTOXIFYLLINE 400 MG: 400 TABLET, EXTENDED RELEASE ORAL at 11:46

## 2019-10-25 RX ADMIN — ALLOPURINOL 100 MG: 100 TABLET ORAL at 11:47

## 2019-10-25 RX ADMIN — DULOXETINE HYDROCHLORIDE 20 MG: 20 CAPSULE, DELAYED RELEASE ORAL at 11:45

## 2019-10-25 RX ADMIN — HALOPERIDOL 5 MG: 5 INJECTION INTRAMUSCULAR at 00:18

## 2019-10-25 RX ADMIN — IMIPRAMINE HYDROCHLORIDE 50 MG: 25 TABLET ORAL at 21:02

## 2019-10-25 RX ADMIN — ROSUVASTATIN CALCIUM 5 MG: 5 TABLET, COATED ORAL at 21:02

## 2019-10-25 RX ADMIN — METOPROLOL TARTRATE 50 MG: 50 TABLET, FILM COATED ORAL at 11:46

## 2019-10-25 RX ADMIN — FLUDROCORTISONE ACETATE 0.1 MG: 0.1 TABLET ORAL at 11:46

## 2019-10-25 RX ADMIN — DONEPEZIL HYDROCHLORIDE 5 MG: 5 TABLET, FILM COATED ORAL at 17:32

## 2019-10-25 RX ADMIN — SODIUM CHLORIDE: 9 INJECTION, SOLUTION INTRAVENOUS at 21:36

## 2019-10-25 RX ADMIN — Medication 10 ML: at 11:45

## 2019-10-25 RX ADMIN — LATANOPROST 1 DROP: 50 SOLUTION/ DROPS OPHTHALMIC at 21:02

## 2019-10-25 RX ADMIN — ASPIRIN 81 MG 81 MG: 81 TABLET ORAL at 11:46

## 2019-10-25 ASSESSMENT — PAIN SCALES - GENERAL: PAINLEVEL_OUTOF10: 0

## 2019-10-26 LAB
ANION GAP SERPL CALCULATED.3IONS-SCNC: 9 MMOL/L (ref 4–16)
BASOPHILS ABSOLUTE: 0.2 K/CU MM
BASOPHILS RELATIVE PERCENT: 2.2 % (ref 0–1)
BUN BLDV-MCNC: 36 MG/DL (ref 6–23)
CALCIUM SERPL-MCNC: 8.8 MG/DL (ref 8.3–10.6)
CHLORIDE BLD-SCNC: 113 MMOL/L (ref 99–110)
CO2: 20 MMOL/L (ref 21–32)
CREAT SERPL-MCNC: 2.2 MG/DL (ref 0.9–1.3)
DIFFERENTIAL TYPE: ABNORMAL
EOSINOPHILS ABSOLUTE: 0.8 K/CU MM
EOSINOPHILS RELATIVE PERCENT: 9.4 % (ref 0–3)
GFR AFRICAN AMERICAN: 34 ML/MIN/1.73M2
GFR NON-AFRICAN AMERICAN: 28 ML/MIN/1.73M2
GLUCOSE BLD-MCNC: 138 MG/DL (ref 70–99)
GLUCOSE BLD-MCNC: 143 MG/DL (ref 70–99)
GLUCOSE BLD-MCNC: 83 MG/DL (ref 70–99)
GLUCOSE BLD-MCNC: 98 MG/DL (ref 70–99)
HCT VFR BLD CALC: 38.2 % (ref 42–52)
HEMOGLOBIN: 11.9 GM/DL (ref 13.5–18)
IMMATURE NEUTROPHIL %: 0.2 % (ref 0–0.43)
LYMPHOCYTES ABSOLUTE: 1.5 K/CU MM
LYMPHOCYTES RELATIVE PERCENT: 19.1 % (ref 24–44)
MCH RBC QN AUTO: 30.7 PG (ref 27–31)
MCHC RBC AUTO-ENTMCNC: 31.2 % (ref 32–36)
MCV RBC AUTO: 98.5 FL (ref 78–100)
MONOCYTES ABSOLUTE: 0.5 K/CU MM
MONOCYTES RELATIVE PERCENT: 6.7 % (ref 0–4)
NUCLEATED RBC %: 0 %
PDW BLD-RTO: 14 % (ref 11.7–14.9)
PLATELET # BLD: 242 K/CU MM (ref 140–440)
PMV BLD AUTO: 10.6 FL (ref 7.5–11.1)
POTASSIUM SERPL-SCNC: 4.5 MMOL/L (ref 3.5–5.1)
RBC # BLD: 3.88 M/CU MM (ref 4.6–6.2)
SEGMENTED NEUTROPHILS ABSOLUTE COUNT: 5 K/CU MM
SEGMENTED NEUTROPHILS RELATIVE PERCENT: 62.4 % (ref 36–66)
SODIUM BLD-SCNC: 142 MMOL/L (ref 135–145)
TOTAL IMMATURE NEUTOROPHIL: 0.02 K/CU MM
TOTAL NUCLEATED RBC: 0 K/CU MM
WBC # BLD: 8.1 K/CU MM (ref 4–10.5)

## 2019-10-26 PROCEDURE — 80048 BASIC METABOLIC PNL TOTAL CA: CPT

## 2019-10-26 PROCEDURE — 85025 COMPLETE CBC W/AUTO DIFF WBC: CPT

## 2019-10-26 PROCEDURE — 6370000000 HC RX 637 (ALT 250 FOR IP): Performed by: INTERNAL MEDICINE

## 2019-10-26 PROCEDURE — 1200000000 HC SEMI PRIVATE

## 2019-10-26 PROCEDURE — 2580000003 HC RX 258: Performed by: INTERNAL MEDICINE

## 2019-10-26 PROCEDURE — 36415 COLL VENOUS BLD VENIPUNCTURE: CPT

## 2019-10-26 PROCEDURE — 82962 GLUCOSE BLOOD TEST: CPT

## 2019-10-26 RX ORDER — METOPROLOL TARTRATE 50 MG/1
50 TABLET, FILM COATED ORAL 2 TIMES DAILY
Qty: 60 TABLET | Refills: 0 | Status: SHIPPED | OUTPATIENT
Start: 2019-10-26 | End: 2019-10-29

## 2019-10-26 RX ORDER — BENZTROPINE MESYLATE 1 MG/1
1 TABLET ORAL DAILY
Qty: 60 TABLET | Refills: 0 | Status: SHIPPED | OUTPATIENT
Start: 2019-10-26 | End: 2019-10-29 | Stop reason: HOSPADM

## 2019-10-26 RX ADMIN — METOPROLOL TARTRATE 50 MG: 50 TABLET, FILM COATED ORAL at 21:09

## 2019-10-26 RX ADMIN — FAMOTIDINE 20 MG: 20 TABLET ORAL at 09:47

## 2019-10-26 RX ADMIN — METOPROLOL TARTRATE 50 MG: 50 TABLET, FILM COATED ORAL at 09:47

## 2019-10-26 RX ADMIN — ALLOPURINOL 100 MG: 100 TABLET ORAL at 09:47

## 2019-10-26 RX ADMIN — LEVOTHYROXINE SODIUM 75 MCG: 75 TABLET ORAL at 06:03

## 2019-10-26 RX ADMIN — Medication 10 ML: at 21:09

## 2019-10-26 RX ADMIN — ASPIRIN 81 MG 81 MG: 81 TABLET ORAL at 09:47

## 2019-10-26 RX ADMIN — IMIPRAMINE HYDROCHLORIDE 50 MG: 25 TABLET ORAL at 21:10

## 2019-10-26 RX ADMIN — TIMOLOL MALEATE 1 DROP: 5 SOLUTION OPHTHALMIC at 09:52

## 2019-10-26 RX ADMIN — PENTOXIFYLLINE 400 MG: 400 TABLET, EXTENDED RELEASE ORAL at 09:47

## 2019-10-26 RX ADMIN — FLUDROCORTISONE ACETATE 0.1 MG: 0.1 TABLET ORAL at 09:47

## 2019-10-26 RX ADMIN — DULOXETINE HYDROCHLORIDE 20 MG: 20 CAPSULE, DELAYED RELEASE ORAL at 09:47

## 2019-10-26 RX ADMIN — ROSUVASTATIN CALCIUM 5 MG: 5 TABLET, COATED ORAL at 21:10

## 2019-10-26 RX ADMIN — PENTOXIFYLLINE 400 MG: 400 TABLET, EXTENDED RELEASE ORAL at 21:09

## 2019-10-27 LAB
BASOPHILS ABSOLUTE: 0.2 K/CU MM
BASOPHILS RELATIVE PERCENT: 1.9 % (ref 0–1)
DIFFERENTIAL TYPE: ABNORMAL
EOSINOPHILS ABSOLUTE: 0.4 K/CU MM
EOSINOPHILS RELATIVE PERCENT: 5.5 % (ref 0–3)
HCT VFR BLD CALC: 39.6 % (ref 42–52)
HEMOGLOBIN: 12.3 GM/DL (ref 13.5–18)
IMMATURE NEUTROPHIL %: 0.3 % (ref 0–0.43)
LYMPHOCYTES ABSOLUTE: 1.7 K/CU MM
LYMPHOCYTES RELATIVE PERCENT: 21.4 % (ref 24–44)
MCH RBC QN AUTO: 29.9 PG (ref 27–31)
MCHC RBC AUTO-ENTMCNC: 31.1 % (ref 32–36)
MCV RBC AUTO: 96.4 FL (ref 78–100)
MONOCYTES ABSOLUTE: 0.7 K/CU MM
MONOCYTES RELATIVE PERCENT: 8.9 % (ref 0–4)
NUCLEATED RBC %: 0 %
PDW BLD-RTO: 13.8 % (ref 11.7–14.9)
PLATELET # BLD: 250 K/CU MM (ref 140–440)
PMV BLD AUTO: 10.8 FL (ref 7.5–11.1)
RBC # BLD: 4.11 M/CU MM (ref 4.6–6.2)
SEGMENTED NEUTROPHILS ABSOLUTE COUNT: 4.8 K/CU MM
SEGMENTED NEUTROPHILS RELATIVE PERCENT: 62 % (ref 36–66)
TOTAL IMMATURE NEUTOROPHIL: 0.02 K/CU MM
TOTAL NUCLEATED RBC: 0 K/CU MM
WBC # BLD: 7.8 K/CU MM (ref 4–10.5)

## 2019-10-27 PROCEDURE — 6370000000 HC RX 637 (ALT 250 FOR IP): Performed by: INTERNAL MEDICINE

## 2019-10-27 PROCEDURE — 36415 COLL VENOUS BLD VENIPUNCTURE: CPT

## 2019-10-27 PROCEDURE — 85025 COMPLETE CBC W/AUTO DIFF WBC: CPT

## 2019-10-27 PROCEDURE — 6360000002 HC RX W HCPCS

## 2019-10-27 PROCEDURE — 1200000000 HC SEMI PRIVATE

## 2019-10-27 PROCEDURE — 2580000003 HC RX 258: Performed by: INTERNAL MEDICINE

## 2019-10-27 RX ORDER — HALOPERIDOL 5 MG/ML
5 INJECTION INTRAMUSCULAR ONCE
Status: COMPLETED | OUTPATIENT
Start: 2019-10-27 | End: 2019-10-27

## 2019-10-27 RX ORDER — HALOPERIDOL 5 MG/ML
2 INJECTION INTRAMUSCULAR ONCE
Status: DISCONTINUED | OUTPATIENT
Start: 2019-10-27 | End: 2019-10-27

## 2019-10-27 RX ORDER — HALOPERIDOL 5 MG/ML
INJECTION INTRAMUSCULAR
Status: COMPLETED
Start: 2019-10-27 | End: 2019-10-27

## 2019-10-27 RX ADMIN — ASPIRIN 81 MG 81 MG: 81 TABLET ORAL at 10:12

## 2019-10-27 RX ADMIN — HALOPERIDOL LACTATE 5 MG: 5 INJECTION, SOLUTION INTRAMUSCULAR at 01:47

## 2019-10-27 RX ADMIN — DULOXETINE HYDROCHLORIDE 20 MG: 20 CAPSULE, DELAYED RELEASE ORAL at 10:12

## 2019-10-27 RX ADMIN — FAMOTIDINE 20 MG: 20 TABLET ORAL at 10:14

## 2019-10-27 RX ADMIN — ALLOPURINOL 100 MG: 100 TABLET ORAL at 10:12

## 2019-10-27 RX ADMIN — HALOPERIDOL 5 MG: 5 INJECTION INTRAMUSCULAR at 01:47

## 2019-10-27 RX ADMIN — LATANOPROST 1 DROP: 50 SOLUTION/ DROPS OPHTHALMIC at 21:52

## 2019-10-27 RX ADMIN — PENTOXIFYLLINE 400 MG: 400 TABLET, EXTENDED RELEASE ORAL at 10:14

## 2019-10-27 RX ADMIN — TIMOLOL MALEATE 1 DROP: 5 SOLUTION OPHTHALMIC at 10:19

## 2019-10-27 RX ADMIN — FLUDROCORTISONE ACETATE 0.1 MG: 0.1 TABLET ORAL at 10:12

## 2019-10-27 RX ADMIN — Medication 10 ML: at 21:55

## 2019-10-28 ENCOUNTER — APPOINTMENT (OUTPATIENT)
Dept: CT IMAGING | Age: 84
DRG: 682 | End: 2019-10-28
Payer: MEDICARE

## 2019-10-28 LAB
ANION GAP SERPL CALCULATED.3IONS-SCNC: 10 MMOL/L (ref 4–16)
APTT: 25.5 SECONDS (ref 25.1–37.1)
BASOPHILS ABSOLUTE: 0.2 K/CU MM
BASOPHILS RELATIVE PERCENT: 2.1 % (ref 0–1)
BUN BLDV-MCNC: 33 MG/DL (ref 6–23)
CALCIUM SERPL-MCNC: 8.3 MG/DL (ref 8.3–10.6)
CHLORIDE BLD-SCNC: 108 MMOL/L (ref 99–110)
CO2: 21 MMOL/L (ref 21–32)
CREAT SERPL-MCNC: 2.1 MG/DL (ref 0.9–1.3)
DIFFERENTIAL TYPE: ABNORMAL
EOSINOPHILS ABSOLUTE: 0.7 K/CU MM
EOSINOPHILS RELATIVE PERCENT: 8.3 % (ref 0–3)
GFR AFRICAN AMERICAN: 36 ML/MIN/1.73M2
GFR NON-AFRICAN AMERICAN: 30 ML/MIN/1.73M2
GLUCOSE BLD-MCNC: 151 MG/DL (ref 70–99)
HCT VFR BLD CALC: 39 % (ref 42–52)
HEMOGLOBIN: 12.1 GM/DL (ref 13.5–18)
IMMATURE NEUTROPHIL %: 0.2 % (ref 0–0.43)
INR BLD: 1.25 INDEX
LYMPHOCYTES ABSOLUTE: 1.7 K/CU MM
LYMPHOCYTES RELATIVE PERCENT: 20.3 % (ref 24–44)
MAGNESIUM: 1.6 MG/DL (ref 1.8–2.4)
MCH RBC QN AUTO: 30.1 PG (ref 27–31)
MCHC RBC AUTO-ENTMCNC: 31 % (ref 32–36)
MCV RBC AUTO: 97 FL (ref 78–100)
MONOCYTES ABSOLUTE: 0.8 K/CU MM
MONOCYTES RELATIVE PERCENT: 8.9 % (ref 0–4)
NUCLEATED RBC %: 0 %
PDW BLD-RTO: 13.8 % (ref 11.7–14.9)
PLATELET # BLD: 219 K/CU MM (ref 140–440)
PMV BLD AUTO: 10.6 FL (ref 7.5–11.1)
POTASSIUM SERPL-SCNC: 4 MMOL/L (ref 3.5–5.1)
PROTHROMBIN TIME: 14.3 SECONDS (ref 11.7–14.5)
RBC # BLD: 4.02 M/CU MM (ref 4.6–6.2)
SEGMENTED NEUTROPHILS ABSOLUTE COUNT: 5.1 K/CU MM
SEGMENTED NEUTROPHILS RELATIVE PERCENT: 60.2 % (ref 36–66)
SODIUM BLD-SCNC: 139 MMOL/L (ref 135–145)
TOTAL IMMATURE NEUTOROPHIL: 0.02 K/CU MM
TOTAL NUCLEATED RBC: 0 K/CU MM
WBC # BLD: 8.4 K/CU MM (ref 4–10.5)

## 2019-10-28 PROCEDURE — 6370000000 HC RX 637 (ALT 250 FOR IP): Performed by: INTERNAL MEDICINE

## 2019-10-28 PROCEDURE — 97530 THERAPEUTIC ACTIVITIES: CPT

## 2019-10-28 PROCEDURE — 2580000003 HC RX 258: Performed by: INTERNAL MEDICINE

## 2019-10-28 PROCEDURE — 85730 THROMBOPLASTIN TIME PARTIAL: CPT

## 2019-10-28 PROCEDURE — 80048 BASIC METABOLIC PNL TOTAL CA: CPT

## 2019-10-28 PROCEDURE — 97162 PT EVAL MOD COMPLEX 30 MIN: CPT

## 2019-10-28 PROCEDURE — 85610 PROTHROMBIN TIME: CPT

## 2019-10-28 PROCEDURE — 36415 COLL VENOUS BLD VENIPUNCTURE: CPT

## 2019-10-28 PROCEDURE — 1200000000 HC SEMI PRIVATE

## 2019-10-28 PROCEDURE — 70450 CT HEAD/BRAIN W/O DYE: CPT

## 2019-10-28 PROCEDURE — 97167 OT EVAL HIGH COMPLEX 60 MIN: CPT

## 2019-10-28 PROCEDURE — 83735 ASSAY OF MAGNESIUM: CPT

## 2019-10-28 PROCEDURE — 85025 COMPLETE CBC W/AUTO DIFF WBC: CPT

## 2019-10-28 PROCEDURE — 6360000002 HC RX W HCPCS: Performed by: NURSE PRACTITIONER

## 2019-10-28 RX ORDER — WARFARIN SODIUM 2 MG/1
2 TABLET ORAL DAILY
Status: DISCONTINUED | OUTPATIENT
Start: 2019-10-28 | End: 2019-10-29 | Stop reason: HOSPADM

## 2019-10-28 RX ORDER — BENZTROPINE MESYLATE 1 MG/ML
1 INJECTION INTRAMUSCULAR; INTRAVENOUS ONCE
Status: COMPLETED | OUTPATIENT
Start: 2019-10-28 | End: 2019-10-28

## 2019-10-28 RX ORDER — HALOPERIDOL 5 MG/ML
5 INJECTION INTRAMUSCULAR ONCE
Status: COMPLETED | OUTPATIENT
Start: 2019-10-28 | End: 2019-10-28

## 2019-10-28 RX ADMIN — HALOPERIDOL LACTATE 5 MG: 5 INJECTION INTRAMUSCULAR at 06:15

## 2019-10-28 RX ADMIN — TIMOLOL MALEATE 1 DROP: 5 SOLUTION OPHTHALMIC at 10:13

## 2019-10-28 RX ADMIN — DULOXETINE HYDROCHLORIDE 20 MG: 20 CAPSULE, DELAYED RELEASE ORAL at 10:00

## 2019-10-28 RX ADMIN — METOPROLOL TARTRATE 25 MG: 25 TABLET ORAL at 22:39

## 2019-10-28 RX ADMIN — WARFARIN SODIUM 2 MG: 2 TABLET ORAL at 22:39

## 2019-10-28 RX ADMIN — ASPIRIN 81 MG 81 MG: 81 TABLET ORAL at 10:00

## 2019-10-28 RX ADMIN — ALLOPURINOL 100 MG: 100 TABLET ORAL at 10:00

## 2019-10-28 RX ADMIN — PENTOXIFYLLINE 400 MG: 400 TABLET, EXTENDED RELEASE ORAL at 22:40

## 2019-10-28 RX ADMIN — LATANOPROST 1 DROP: 50 SOLUTION/ DROPS OPHTHALMIC at 22:39

## 2019-10-28 RX ADMIN — PENTOXIFYLLINE 400 MG: 400 TABLET, EXTENDED RELEASE ORAL at 10:00

## 2019-10-28 RX ADMIN — BENZTROPINE MESYLATE 1 MG: 1 INJECTION INTRAMUSCULAR; INTRAVENOUS at 06:15

## 2019-10-28 RX ADMIN — IMIPRAMINE HYDROCHLORIDE 50 MG: 25 TABLET ORAL at 22:39

## 2019-10-28 RX ADMIN — Medication 10 ML: at 10:01

## 2019-10-28 RX ADMIN — FAMOTIDINE 20 MG: 20 TABLET ORAL at 10:00

## 2019-10-28 RX ADMIN — FLUDROCORTISONE ACETATE 0.1 MG: 0.1 TABLET ORAL at 10:00

## 2019-10-28 RX ADMIN — ROSUVASTATIN CALCIUM 5 MG: 5 TABLET, COATED ORAL at 22:39

## 2019-10-29 VITALS
OXYGEN SATURATION: 99 % | TEMPERATURE: 97.6 F | BODY MASS INDEX: 23.35 KG/M2 | HEART RATE: 64 BPM | WEIGHT: 166.8 LBS | HEIGHT: 71 IN | DIASTOLIC BLOOD PRESSURE: 84 MMHG | SYSTOLIC BLOOD PRESSURE: 120 MMHG | RESPIRATION RATE: 11 BRPM

## 2019-10-29 LAB
ANION GAP SERPL CALCULATED.3IONS-SCNC: 7 MMOL/L (ref 4–16)
BASOPHILS ABSOLUTE: 0.1 K/CU MM
BASOPHILS RELATIVE PERCENT: 1.5 % (ref 0–1)
BUN BLDV-MCNC: 25 MG/DL (ref 6–23)
CALCIUM SERPL-MCNC: 8.4 MG/DL (ref 8.3–10.6)
CHLORIDE BLD-SCNC: 108 MMOL/L (ref 99–110)
CO2: 24 MMOL/L (ref 21–32)
CREAT SERPL-MCNC: 2 MG/DL (ref 0.9–1.3)
DIFFERENTIAL TYPE: ABNORMAL
EOSINOPHILS ABSOLUTE: 0.6 K/CU MM
EOSINOPHILS RELATIVE PERCENT: 7.1 % (ref 0–3)
FERRITIN: 210 NG/ML (ref 30–400)
FOLATE: 11.6 NG/ML (ref 3.1–17.5)
GFR AFRICAN AMERICAN: 38 ML/MIN/1.73M2
GFR NON-AFRICAN AMERICAN: 31 ML/MIN/1.73M2
GLUCOSE BLD-MCNC: 94 MG/DL (ref 70–99)
HCT VFR BLD CALC: 37.3 % (ref 42–52)
HEMOGLOBIN: 11.6 GM/DL (ref 13.5–18)
IMMATURE NEUTROPHIL %: 0.4 % (ref 0–0.43)
INR BLD: 1.13 INDEX
IRON: 25 UG/DL (ref 59–158)
LYMPHOCYTES ABSOLUTE: 1.4 K/CU MM
LYMPHOCYTES RELATIVE PERCENT: 17.7 % (ref 24–44)
MAGNESIUM: 1.7 MG/DL (ref 1.8–2.4)
MCH RBC QN AUTO: 30 PG (ref 27–31)
MCHC RBC AUTO-ENTMCNC: 31.1 % (ref 32–36)
MCV RBC AUTO: 96.4 FL (ref 78–100)
MONOCYTES ABSOLUTE: 0.8 K/CU MM
MONOCYTES RELATIVE PERCENT: 9.4 % (ref 0–4)
NUCLEATED RBC %: 0 %
PCT TRANSFERRIN: 12 % (ref 10–44)
PDW BLD-RTO: 14 % (ref 11.7–14.9)
PLATELET # BLD: 216 K/CU MM (ref 140–440)
PMV BLD AUTO: 10.8 FL (ref 7.5–11.1)
POTASSIUM SERPL-SCNC: 4 MMOL/L (ref 3.5–5.1)
PROTHROMBIN TIME: 12.9 SECONDS (ref 11.7–14.5)
RBC # BLD: 3.87 M/CU MM (ref 4.6–6.2)
SEGMENTED NEUTROPHILS ABSOLUTE COUNT: 5.2 K/CU MM
SEGMENTED NEUTROPHILS RELATIVE PERCENT: 63.9 % (ref 36–66)
SODIUM BLD-SCNC: 139 MMOL/L (ref 135–145)
TOTAL IMMATURE NEUTOROPHIL: 0.03 K/CU MM
TOTAL IRON BINDING CAPACITY: 214 UG/DL (ref 250–450)
TOTAL NUCLEATED RBC: 0 K/CU MM
TSH HIGH SENSITIVITY: 4.03 UIU/ML (ref 0.27–4.2)
UNSATURATED IRON BINDING CAPACITY: 189 UG/DL (ref 110–370)
VITAMIN B-12: 638.9 PG/ML (ref 211–911)
WBC # BLD: 8.1 K/CU MM (ref 4–10.5)

## 2019-10-29 PROCEDURE — 85610 PROTHROMBIN TIME: CPT

## 2019-10-29 PROCEDURE — 85025 COMPLETE CBC W/AUTO DIFF WBC: CPT

## 2019-10-29 PROCEDURE — 82728 ASSAY OF FERRITIN: CPT

## 2019-10-29 PROCEDURE — 6370000000 HC RX 637 (ALT 250 FOR IP): Performed by: INTERNAL MEDICINE

## 2019-10-29 PROCEDURE — 82746 ASSAY OF FOLIC ACID SERUM: CPT

## 2019-10-29 PROCEDURE — 36415 COLL VENOUS BLD VENIPUNCTURE: CPT

## 2019-10-29 PROCEDURE — 83540 ASSAY OF IRON: CPT

## 2019-10-29 PROCEDURE — 80048 BASIC METABOLIC PNL TOTAL CA: CPT

## 2019-10-29 PROCEDURE — 83550 IRON BINDING TEST: CPT

## 2019-10-29 PROCEDURE — 84443 ASSAY THYROID STIM HORMONE: CPT

## 2019-10-29 PROCEDURE — 83735 ASSAY OF MAGNESIUM: CPT

## 2019-10-29 PROCEDURE — 82607 VITAMIN B-12: CPT

## 2019-10-29 RX ORDER — LORAZEPAM 2 MG/ML
0.5 INJECTION INTRAMUSCULAR ONCE
Status: DISCONTINUED | OUTPATIENT
Start: 2019-10-29 | End: 2019-10-29

## 2019-10-29 RX ORDER — LISINOPRIL 20 MG/1
20 TABLET ORAL DAILY
Status: DISCONTINUED | OUTPATIENT
Start: 2019-10-29 | End: 2019-10-29 | Stop reason: HOSPADM

## 2019-10-29 RX ORDER — METOPROLOL TARTRATE 50 MG/1
50 TABLET, FILM COATED ORAL 2 TIMES DAILY
Qty: 60 TABLET | Refills: 0
Start: 2019-10-29

## 2019-10-29 RX ORDER — LISINOPRIL 20 MG/1
20 TABLET ORAL DAILY
Qty: 1 TABLET | Refills: 0
Start: 2019-10-29

## 2019-10-29 RX ORDER — LORAZEPAM 0.5 MG/1
0.5 TABLET ORAL ONCE
Status: COMPLETED | OUTPATIENT
Start: 2019-10-29 | End: 2019-10-29

## 2019-10-29 RX ORDER — CLONIDINE HYDROCHLORIDE 0.3 MG/1
0.3 TABLET ORAL 2 TIMES DAILY PRN
Qty: 60 TABLET | Refills: 3
Start: 2019-10-29

## 2019-10-29 RX ORDER — METOPROLOL TARTRATE 50 MG/1
50 TABLET, FILM COATED ORAL 2 TIMES DAILY
Status: DISCONTINUED | OUTPATIENT
Start: 2019-10-29 | End: 2019-10-29 | Stop reason: HOSPADM

## 2019-10-29 RX ADMIN — LISINOPRIL 20 MG: 20 TABLET ORAL at 16:14

## 2019-10-29 RX ADMIN — ASPIRIN 81 MG 81 MG: 81 TABLET ORAL at 09:11

## 2019-10-29 RX ADMIN — DULOXETINE HYDROCHLORIDE 20 MG: 20 CAPSULE, DELAYED RELEASE ORAL at 09:11

## 2019-10-29 RX ADMIN — LEVOTHYROXINE SODIUM 75 MCG: 75 TABLET ORAL at 05:32

## 2019-10-29 RX ADMIN — FLUDROCORTISONE ACETATE 0.1 MG: 0.1 TABLET ORAL at 09:11

## 2019-10-29 RX ADMIN — METOPROLOL TARTRATE 25 MG: 25 TABLET ORAL at 09:22

## 2019-10-29 RX ADMIN — ALLOPURINOL 100 MG: 100 TABLET ORAL at 09:11

## 2019-10-29 RX ADMIN — FAMOTIDINE 20 MG: 20 TABLET ORAL at 09:11

## 2019-10-29 RX ADMIN — WARFARIN SODIUM 2 MG: 2 TABLET ORAL at 17:01

## 2019-10-29 RX ADMIN — LORAZEPAM 0.5 MG: 0.5 TABLET ORAL at 16:13

## 2019-10-29 RX ADMIN — PENTOXIFYLLINE 400 MG: 400 TABLET, EXTENDED RELEASE ORAL at 09:22

## 2019-10-29 RX ADMIN — TIMOLOL MALEATE 1 DROP: 5 SOLUTION OPHTHALMIC at 10:16

## 2019-10-29 ASSESSMENT — PAIN SCALES - GENERAL: PAINLEVEL_OUTOF10: 0
